# Patient Record
Sex: MALE | Race: BLACK OR AFRICAN AMERICAN | Employment: FULL TIME | URBAN - METROPOLITAN AREA
[De-identification: names, ages, dates, MRNs, and addresses within clinical notes are randomized per-mention and may not be internally consistent; named-entity substitution may affect disease eponyms.]

---

## 2021-10-24 ENCOUNTER — APPOINTMENT (OUTPATIENT)
Dept: GENERAL RADIOLOGY | Age: 41
DRG: 045 | End: 2021-10-24
Payer: MEDICAID

## 2021-10-24 ENCOUNTER — APPOINTMENT (OUTPATIENT)
Dept: CT IMAGING | Age: 41
DRG: 045 | End: 2021-10-24
Payer: MEDICAID

## 2021-10-24 ENCOUNTER — HOSPITAL ENCOUNTER (INPATIENT)
Age: 41
LOS: 1 days | Discharge: HOME | DRG: 045 | End: 2021-10-26
Attending: EMERGENCY MEDICINE | Admitting: INTERNAL MEDICINE
Payer: MEDICAID

## 2021-10-24 ENCOUNTER — HOSPITAL ENCOUNTER (EMERGENCY)
Age: 41
Discharge: LEFT BEFORE DISCHARGE | DRG: 045 | End: 2021-10-24
Attending: EMERGENCY MEDICINE
Payer: MEDICAID

## 2021-10-24 VITALS
OXYGEN SATURATION: 98 % | HEART RATE: 92 BPM | RESPIRATION RATE: 18 BRPM | BODY MASS INDEX: 29.53 KG/M2 | DIASTOLIC BLOOD PRESSURE: 128 MMHG | SYSTOLIC BLOOD PRESSURE: 221 MMHG | WEIGHT: 200 LBS | TEMPERATURE: 97.2 F

## 2021-10-24 DIAGNOSIS — G45.9 TIA (TRANSIENT ISCHEMIC ATTACK): Primary | ICD-10-CM

## 2021-10-24 DIAGNOSIS — G45.9 TIA (TRANSIENT ISCHEMIC ATTACK): ICD-10-CM

## 2021-10-24 DIAGNOSIS — I16.1 HYPERTENSIVE EMERGENCY: Primary | ICD-10-CM

## 2021-10-24 PROBLEM — R79.89 ELEVATED SERUM CREATININE: Status: ACTIVE | Noted: 2021-10-24

## 2021-10-24 LAB
AMPHETAMINE SCREEN, URINE: NOT DETECTED
ANION GAP SERPL CALCULATED.3IONS-SCNC: 12 MMOL/L (ref 7–16)
ANION GAP SERPL CALCULATED.3IONS-SCNC: 13 MMOL/L (ref 7–16)
APTT: 32.5 SEC (ref 24.5–35.1)
BARBITURATE SCREEN URINE: NOT DETECTED
BASOPHILS ABSOLUTE: 0.02 E9/L (ref 0–0.2)
BASOPHILS RELATIVE PERCENT: 0.3 % (ref 0–2)
BENZODIAZEPINE SCREEN, URINE: NOT DETECTED
BUN BLDV-MCNC: 15 MG/DL (ref 6–20)
BUN BLDV-MCNC: 19 MG/DL (ref 6–20)
CALCIUM SERPL-MCNC: 9.1 MG/DL (ref 8.6–10.2)
CALCIUM SERPL-MCNC: 9.6 MG/DL (ref 8.6–10.2)
CANNABINOID SCREEN URINE: NOT DETECTED
CHLORIDE BLD-SCNC: 102 MMOL/L (ref 98–107)
CHLORIDE BLD-SCNC: 104 MMOL/L (ref 98–107)
CHLORIDE URINE RANDOM: 141 MMOL/L
CHOLESTEROL, TOTAL: 220 MG/DL (ref 0–199)
CO2: 22 MMOL/L (ref 22–29)
CO2: 25 MMOL/L (ref 22–29)
COCAINE METABOLITE SCREEN URINE: NOT DETECTED
CREAT SERPL-MCNC: 1.1 MG/DL (ref 0.7–1.2)
CREAT SERPL-MCNC: 1.3 MG/DL (ref 0.7–1.2)
CREATININE URINE: 258 MG/DL (ref 40–278)
CREATININE URINE: 260 MG/DL (ref 40–278)
CREATININE URINE: 261 MG/DL (ref 40–278)
EOSINOPHILS ABSOLUTE: 0.29 E9/L (ref 0.05–0.5)
EOSINOPHILS RELATIVE PERCENT: 5 % (ref 0–6)
FENTANYL SCREEN, URINE: NOT DETECTED
GFR AFRICAN AMERICAN: >60
GFR AFRICAN AMERICAN: >60
GFR NON-AFRICAN AMERICAN: >60 ML/MIN/1.73
GFR NON-AFRICAN AMERICAN: >60 ML/MIN/1.73
GLUCOSE BLD-MCNC: 106 MG/DL (ref 74–99)
GLUCOSE BLD-MCNC: 114 MG/DL (ref 74–99)
HBA1C MFR BLD: 5.2 % (ref 4–5.6)
HCT VFR BLD CALC: 46.6 % (ref 37–54)
HDLC SERPL-MCNC: 61 MG/DL
HEMOGLOBIN: 16.3 G/DL (ref 12.5–16.5)
IMMATURE GRANULOCYTES #: 0.03 E9/L
IMMATURE GRANULOCYTES %: 0.5 % (ref 0–5)
INR BLD: 1
LDL CHOLESTEROL CALCULATED: 150 MG/DL (ref 0–99)
LYMPHOCYTES ABSOLUTE: 2.06 E9/L (ref 1.5–4)
LYMPHOCYTES RELATIVE PERCENT: 35.7 % (ref 20–42)
Lab: NORMAL
MCH RBC QN AUTO: 29.4 PG (ref 26–35)
MCHC RBC AUTO-ENTMCNC: 35 % (ref 32–34.5)
MCV RBC AUTO: 84.1 FL (ref 80–99.9)
METER GLUCOSE: 103 MG/DL (ref 74–99)
METHADONE SCREEN, URINE: NOT DETECTED
MICROALBUMIN UR-MCNC: 33 MG/L
MICROALBUMIN/CREAT UR-RTO: 12.8 (ref 0–30)
MONOCYTES ABSOLUTE: 0.66 E9/L (ref 0.1–0.95)
MONOCYTES RELATIVE PERCENT: 11.4 % (ref 2–12)
NEUTROPHILS ABSOLUTE: 2.71 E9/L (ref 1.8–7.3)
NEUTROPHILS RELATIVE PERCENT: 47.1 % (ref 43–80)
OPIATE SCREEN URINE: NOT DETECTED
OXYCODONE URINE: NOT DETECTED
PDW BLD-RTO: 12.2 FL (ref 11.5–15)
PHENCYCLIDINE SCREEN URINE: NOT DETECTED
PLATELET # BLD: 170 E9/L (ref 130–450)
PMV BLD AUTO: 10.1 FL (ref 7–12)
POTASSIUM REFLEX MAGNESIUM: 3.6 MMOL/L (ref 3.5–5)
POTASSIUM SERPL-SCNC: 4 MMOL/L (ref 3.5–5)
POTASSIUM, UR: 96.9 MMOL/L
PROTEIN PROTEIN: 18 MG/DL (ref 0–12)
PROTEIN/CREAT RATIO: 0.1
PROTEIN/CREAT RATIO: 0.1 (ref 0–0.2)
PROTHROMBIN TIME: 10.6 SEC (ref 9.3–12.4)
RBC # BLD: 5.54 E12/L (ref 3.8–5.8)
SODIUM BLD-SCNC: 138 MMOL/L (ref 132–146)
SODIUM BLD-SCNC: 140 MMOL/L (ref 132–146)
SODIUM URINE: 173 MMOL/L
TRIGL SERPL-MCNC: 44 MG/DL (ref 0–149)
TROPONIN, HIGH SENSITIVITY: 7 NG/L (ref 0–11)
VLDLC SERPL CALC-MCNC: 9 MG/DL
WBC # BLD: 5.8 E9/L (ref 4.5–11.5)

## 2021-10-24 PROCEDURE — 99283 EMERGENCY DEPT VISIT LOW MDM: CPT

## 2021-10-24 PROCEDURE — 84300 ASSAY OF URINE SODIUM: CPT

## 2021-10-24 PROCEDURE — 82436 ASSAY OF URINE CHLORIDE: CPT

## 2021-10-24 PROCEDURE — 2500000003 HC RX 250 WO HCPCS: Performed by: EMERGENCY MEDICINE

## 2021-10-24 PROCEDURE — 80061 LIPID PANEL: CPT

## 2021-10-24 PROCEDURE — 6370000000 HC RX 637 (ALT 250 FOR IP): Performed by: INTERNAL MEDICINE

## 2021-10-24 PROCEDURE — 82044 UR ALBUMIN SEMIQUANTITATIVE: CPT

## 2021-10-24 PROCEDURE — 80048 BASIC METABOLIC PNL TOTAL CA: CPT

## 2021-10-24 PROCEDURE — 82570 ASSAY OF URINE CREATININE: CPT

## 2021-10-24 PROCEDURE — 71045 X-RAY EXAM CHEST 1 VIEW: CPT

## 2021-10-24 PROCEDURE — 93005 ELECTROCARDIOGRAM TRACING: CPT | Performed by: EMERGENCY MEDICINE

## 2021-10-24 PROCEDURE — 84133 ASSAY OF URINE POTASSIUM: CPT

## 2021-10-24 PROCEDURE — 82962 GLUCOSE BLOOD TEST: CPT

## 2021-10-24 PROCEDURE — 85025 COMPLETE CBC W/AUTO DIFF WBC: CPT

## 2021-10-24 PROCEDURE — 96374 THER/PROPH/DIAG INJ IV PUSH: CPT

## 2021-10-24 PROCEDURE — 2580000003 HC RX 258: Performed by: INTERNAL MEDICINE

## 2021-10-24 PROCEDURE — 99284 EMERGENCY DEPT VISIT MOD MDM: CPT

## 2021-10-24 PROCEDURE — 83036 HEMOGLOBIN GLYCOSYLATED A1C: CPT

## 2021-10-24 PROCEDURE — 80307 DRUG TEST PRSMV CHEM ANLYZR: CPT

## 2021-10-24 PROCEDURE — 70450 CT HEAD/BRAIN W/O DYE: CPT

## 2021-10-24 PROCEDURE — 99220 PR INITIAL OBSERVATION CARE/DAY 70 MINUTES: CPT | Performed by: INTERNAL MEDICINE

## 2021-10-24 PROCEDURE — 99254 IP/OBS CNSLTJ NEW/EST MOD 60: CPT | Performed by: PSYCHIATRY & NEUROLOGY

## 2021-10-24 PROCEDURE — 6370000000 HC RX 637 (ALT 250 FOR IP): Performed by: PSYCHIATRY & NEUROLOGY

## 2021-10-24 PROCEDURE — 96376 TX/PRO/DX INJ SAME DRUG ADON: CPT

## 2021-10-24 PROCEDURE — 85610 PROTHROMBIN TIME: CPT

## 2021-10-24 PROCEDURE — G0378 HOSPITAL OBSERVATION PER HR: HCPCS

## 2021-10-24 PROCEDURE — 84484 ASSAY OF TROPONIN QUANT: CPT

## 2021-10-24 PROCEDURE — 6360000002 HC RX W HCPCS: Performed by: EMERGENCY MEDICINE

## 2021-10-24 PROCEDURE — 85730 THROMBOPLASTIN TIME PARTIAL: CPT

## 2021-10-24 PROCEDURE — 96375 TX/PRO/DX INJ NEW DRUG ADDON: CPT

## 2021-10-24 PROCEDURE — 36415 COLL VENOUS BLD VENIPUNCTURE: CPT

## 2021-10-24 PROCEDURE — 6360000002 HC RX W HCPCS: Performed by: INTERNAL MEDICINE

## 2021-10-24 PROCEDURE — 84156 ASSAY OF PROTEIN URINE: CPT

## 2021-10-24 RX ORDER — ACETAMINOPHEN 650 MG/1
650 SUPPOSITORY RECTAL EVERY 6 HOURS PRN
Status: DISCONTINUED | OUTPATIENT
Start: 2021-10-24 | End: 2021-10-26 | Stop reason: HOSPADM

## 2021-10-24 RX ORDER — CLOPIDOGREL BISULFATE 75 MG/1
75 TABLET ORAL DAILY
Status: DISCONTINUED | OUTPATIENT
Start: 2021-10-24 | End: 2021-10-26 | Stop reason: HOSPADM

## 2021-10-24 RX ORDER — LABETALOL HYDROCHLORIDE 5 MG/ML
10 INJECTION, SOLUTION INTRAVENOUS EVERY 4 HOURS PRN
Status: DISCONTINUED | OUTPATIENT
Start: 2021-10-24 | End: 2021-10-25

## 2021-10-24 RX ORDER — ASPIRIN 81 MG/1
81 TABLET, CHEWABLE ORAL DAILY
Status: DISCONTINUED | OUTPATIENT
Start: 2021-10-24 | End: 2021-10-26 | Stop reason: HOSPADM

## 2021-10-24 RX ORDER — SODIUM CHLORIDE 9 MG/ML
25 INJECTION, SOLUTION INTRAVENOUS PRN
Status: DISCONTINUED | OUTPATIENT
Start: 2021-10-24 | End: 2021-10-26 | Stop reason: HOSPADM

## 2021-10-24 RX ORDER — HYDRALAZINE HYDROCHLORIDE 20 MG/ML
10 INJECTION INTRAMUSCULAR; INTRAVENOUS ONCE
Status: COMPLETED | OUTPATIENT
Start: 2021-10-24 | End: 2021-10-24

## 2021-10-24 RX ORDER — AMLODIPINE BESYLATE 5 MG/1
5 TABLET ORAL DAILY
Status: DISCONTINUED | OUTPATIENT
Start: 2021-10-24 | End: 2021-10-24

## 2021-10-24 RX ORDER — LABETALOL HYDROCHLORIDE 5 MG/ML
10 INJECTION, SOLUTION INTRAVENOUS ONCE
Status: COMPLETED | OUTPATIENT
Start: 2021-10-24 | End: 2021-10-24

## 2021-10-24 RX ORDER — SODIUM CHLORIDE 0.9 % (FLUSH) 0.9 %
10 SYRINGE (ML) INJECTION EVERY 12 HOURS SCHEDULED
Status: DISCONTINUED | OUTPATIENT
Start: 2021-10-24 | End: 2021-10-26 | Stop reason: HOSPADM

## 2021-10-24 RX ORDER — ONDANSETRON 2 MG/ML
4 INJECTION INTRAMUSCULAR; INTRAVENOUS EVERY 6 HOURS PRN
Status: DISCONTINUED | OUTPATIENT
Start: 2021-10-24 | End: 2021-10-26 | Stop reason: HOSPADM

## 2021-10-24 RX ORDER — ONDANSETRON 4 MG/1
4 TABLET, ORALLY DISINTEGRATING ORAL EVERY 8 HOURS PRN
Status: DISCONTINUED | OUTPATIENT
Start: 2021-10-24 | End: 2021-10-26 | Stop reason: HOSPADM

## 2021-10-24 RX ORDER — AMLODIPINE BESYLATE 10 MG/1
10 TABLET ORAL DAILY
Status: DISCONTINUED | OUTPATIENT
Start: 2021-10-25 | End: 2021-10-26 | Stop reason: HOSPADM

## 2021-10-24 RX ORDER — ACETAMINOPHEN 325 MG/1
650 TABLET ORAL EVERY 6 HOURS PRN
Status: DISCONTINUED | OUTPATIENT
Start: 2021-10-24 | End: 2021-10-26 | Stop reason: HOSPADM

## 2021-10-24 RX ORDER — POLYETHYLENE GLYCOL 3350 17 G/17G
17 POWDER, FOR SOLUTION ORAL DAILY PRN
Status: DISCONTINUED | OUTPATIENT
Start: 2021-10-24 | End: 2021-10-26 | Stop reason: HOSPADM

## 2021-10-24 RX ORDER — HYDRALAZINE HYDROCHLORIDE 20 MG/ML
10 INJECTION INTRAMUSCULAR; INTRAVENOUS EVERY 4 HOURS PRN
Status: DISCONTINUED | OUTPATIENT
Start: 2021-10-24 | End: 2021-10-24

## 2021-10-24 RX ORDER — ATORVASTATIN CALCIUM 40 MG/1
40 TABLET, FILM COATED ORAL NIGHTLY
Status: DISCONTINUED | OUTPATIENT
Start: 2021-10-24 | End: 2021-10-26 | Stop reason: HOSPADM

## 2021-10-24 RX ORDER — SODIUM CHLORIDE 0.9 % (FLUSH) 0.9 %
10 SYRINGE (ML) INJECTION PRN
Status: DISCONTINUED | OUTPATIENT
Start: 2021-10-24 | End: 2021-10-26 | Stop reason: HOSPADM

## 2021-10-24 RX ADMIN — HYDRALAZINE HYDROCHLORIDE 10 MG: 20 INJECTION INTRAMUSCULAR; INTRAVENOUS at 14:50

## 2021-10-24 RX ADMIN — HYDRALAZINE HYDROCHLORIDE 10 MG: 20 INJECTION INTRAMUSCULAR; INTRAVENOUS at 08:57

## 2021-10-24 RX ADMIN — Medication 10 ML: at 19:42

## 2021-10-24 RX ADMIN — ASPIRIN 81 MG CHEWABLE TABLET 81 MG: 81 TABLET CHEWABLE at 14:50

## 2021-10-24 RX ADMIN — LABETALOL HYDROCHLORIDE 10 MG: 5 INJECTION, SOLUTION INTRAVENOUS at 05:04

## 2021-10-24 RX ADMIN — LABETALOL HYDROCHLORIDE 10 MG: 5 INJECTION INTRAVENOUS at 06:45

## 2021-10-24 RX ADMIN — CLOPIDOGREL BISULFATE 75 MG: 75 TABLET ORAL at 16:33

## 2021-10-24 RX ADMIN — AMLODIPINE BESYLATE 5 MG: 5 TABLET ORAL at 14:50

## 2021-10-24 RX ADMIN — ONDANSETRON 4 MG: 2 INJECTION INTRAMUSCULAR; INTRAVENOUS at 17:54

## 2021-10-24 RX ADMIN — ATORVASTATIN CALCIUM 40 MG: 40 TABLET, FILM COATED ORAL at 19:41

## 2021-10-24 NOTE — H&P
company     Family History:   History reviewed. No pertinent family history. REVIEW OF SYSTEMS:    · Constitutional: No fever, no chills, no change in weight; good appetite  · HEENT: No blurred vision, no ear problems, no sore throat, no rhinorrhea. · Respiratory: No cough, no sputum production, no pleuritic chest pain, no shortness of breath  · Cardiology: No angina, no dyspnea on exertion, no paroxysmal nocturnal dyspnea, no orthopnea, no palpitation, no leg swelling. · Gastroenterology: No dysphagia, no reflux; no abdominal pain, no nausea or vomiting; no constipation or diarrhea.  No hematochezia   · Genitourinary: No dysuria, no frequency, hesitancy; no hematuria  · Musculoskeletal: no joint pain, no myalgia, no change in range of movement  · Neurology: felling slight heaviness in the right upper and lower extremity , no slurred speech, no double vision, no tingling or numbness sensation  · Endocrinology: no temperature intolerance, no polyphagia, polydipsia or polyuria  · Hematology: no increased bleeding, no bruising, no lymphadenopathy  · Skin: no skin changes noticed by patient  · Psychology: no depressed mood, no suicidal ideation    Physical Exam   · Vitals: BP (!) 213/128   Pulse 88   Temp 98.7 °F (37.1 °C)   Resp 18   Wt 220 lb (99.8 kg)   SpO2 99%   BMI 32.49 kg/m²     · Head: normocephalic and atraumatic  · Eyes: pupils equal, round, and reactive to light, extraocular eye movements intact, conjunctivae normal  · ENT: tympanic membrane, external ear and ear canal normal bilaterally, oropharynx clear and moist with normal mucous membranes  · Neck: neck supple and non tender without mass, no thyromegaly or thyroid nodules, no cervical lymphadenopathy   · Pulmonary/Chest: clear to auscultation bilaterally- no wheezes, rales or rhonchi, normal air movement, no respiratory distress  · Cardiovascular: normal rate, normal S1 and S2, no gallops, intact distal pulses and no carotid bruits  · Abdomen: soft, non-tender, non-distended, normal bowel sounds, no masses or organomegaly  · Extremities: no cyanosis and no clubbing  · Neurologic: gait and coordination normal and speech normal, cranial nerves intact, no sensory or motor deficit     Labs and Imaging Studies   Basic Labs  Recent Labs     10/24/21  0434      K 3.6      CO2 25   BUN 19   CREATININE 1.3*   GLUCOSE 114*   CALCIUM 9.1       Recent Labs     10/24/21  0434   WBC 5.8   RBC 5.54   HGB 16.3   HCT 46.6   MCV 84.1   MCH 29.4   MCHC 35.0*   RDW 12.2      MPV 10.1         Imaging Studies:  No orders to display            Resident's Assessment and Plan     Assessment and Plan:    Acute onset right sided upper and lower extremity weakness 2/2 TIA vs hypertensive emergency   · Acute onset of the symptoms and rapid resolution within 24 hours  · Blood pressure: 213/128  · Head CT negative for any acute abnormality  · ABCD score 5 (may consider DAPT for 21 days)  · Hb A1C: 5.2   · lipid panel: , HDL:66, total cholesterol 220    · Carotid ultrasound   · Brain MRI w and w/o contrast   · Neck MRA   · Urine drug screen   · Permissive hypertension pre neurology recs  · Start ASA 81 mg  · Start Lipitor 40 mg    Newly diagnosed hypertension    · Started on Amlodipine 10 mg daily   · Labetalol PRN     Elevated creatinine, chronic vs acute?    · Baseline creatinine is unknown   · Given the fact that Cr is trending down within a short period of time, it's more likely an acute process. (possiblly 2/2 hypertensive emergency)  · Trend renal function        PT/OT evaluation: none   DVT prophylaxis/ GI prophylaxis: Lovenox   Disposition: admit to the telemetry     Sophia Menas MD, PGY-1  Attending physician: Dr. Judge Soto

## 2021-10-24 NOTE — CONSULTS
Neurology - Inpatient Consultation     Consult Requested By: Asya Reeves MD    Indication for Consult: TIA    History of Present Illness: The patient is a 78-year-old male with no prior medical history who presented to the emergency department with complaints of weakness of his right arm and right leg characterized by clumsiness of his right arm and heaviness of his right leg. Symptoms started suddenly at 9 PM yesterday while he was at home and persisted until approximately 1 PM today. Blood pressure this morning was 221/121. Patient is currently neurologically nonfocal.      Past Medical History:   History reviewed. No pertinent past medical history. Past Surgical History:   History reviewed. No pertinent surgical history. Allergies:   Patient has no known allergies. Medications:     Prior to Admission medications    Not on File       Social History:     Social History     Tobacco Use    Smoking status: Never Smoker   Substance Use Topics    Alcohol use: No    Drug use: No       Review of Systems:   General/constitutional, Dermatologic, Ophthalmologic, ENT, Cardiovascular, Pulmonary, Gastrointestinal, Genitourinary, Psychiatric, Neurological, Hematologic, Musculoskeletal and Endocrine systems were reviewed. Symptoms for each system are noted in this note or are otherwise negative. Family History:   Stroke in father    Objective:   BP (!) 196/122 Comment: manual  Pulse 99   Temp 97 °F (36.1 °C) (Temporal)   Resp 16   Ht 5' 11\" (1.803 m)   Wt 207 lb 3.7 oz (94 kg)   SpO2 97%   BMI 28.90 kg/m²     GENERAL: Patient is alert and interacts appropriately; comprehension and speech are normal.  PSYCHIATRIC: Normal mood; affect appropriate to context. HEENT: Atraumatic; Normocephalic; PERRL; EOMI; moist oropharynx. RESPIRATORY: Breathing is unlabored; respiratory rate normal; symmetric air intake and chest rise. CARDIOVASCULAR: Regular rate and rhythm; no ankle edema.   GASTROINTESTINAL: Abdomen soft, non-tender, non-distended. SKIN: No vesicular, erythematous, or ecchymotic lesions. MUSCULOSKELETAL: Muscle tone and range of motion normal in all extremities; no resting tremors or rigidity. NEUROLOGICAL: Cranial nerves II through XII intact; left arm motor strength is 5/5; right arm motor strength is 5/5; left leg motor strength is 5/5; right leg motor strength is 5/5; cutaneous sensation is intact and symmetric throughout; no extinction to double simultaneous stimulation; deep tendon reflexes are 2+ in biceps and patellae bilaterally; no dysmetria on finger-to-nose testing. Laboratory/Radiology:     CBC:   Lab Results   Component Value Date    WBC 5.8 10/24/2021    RBC 5.54 10/24/2021    HGB 16.3 10/24/2021    HCT 46.6 10/24/2021    MCV 84.1 10/24/2021    MCH 29.4 10/24/2021    MCHC 35.0 10/24/2021    RDW 12.2 10/24/2021     10/24/2021    MPV 10.1 10/24/2021     CMP:    Lab Results   Component Value Date     10/24/2021    K 4.0 10/24/2021    K 3.6 10/24/2021     10/24/2021    CO2 22 10/24/2021    BUN 15 10/24/2021    CREATININE 1.1 10/24/2021    GFRAA >60 10/24/2021    LABGLOM >60 10/24/2021    GLUCOSE 106 10/24/2021    CALCIUM 9.6 10/24/2021       Lab Results   Component Value Date    LABA1C 5.2 10/24/2021     Lab Results   Component Value Date    LDLCALC 150 (H) 10/24/2021       CT Head WO Contrast    Result Date: 10/24/2021  No acute intracranial abnormality. XR CHEST PORTABLE    Result Date: 10/24/2021  No acute process. I have independently reviewed the most recent labs, diagnostic studies and neuroradiological images available for this patient.     Assessment:   TIA with hypertensive emergency resulting in prolonged weakness of right arm and right leg (weakness resolved)      Plan:   Continue daily aspirin and statin started this admission; Plavix for 21 days(ABCD2 score 5)    Permissive hypertension for 24 hours; continue daily amlodipine started this admission    Complete stroke work-up with MRI brain MRA head and neck and TTE.     Neurochecks every 4 hours      Torres Potter MD,   3:42 PM  10/24/2021

## 2021-10-24 NOTE — ED NOTES
Patient is requesting to leave AMA to take daughter home. Dr. Jeovany Redd made aware. IV removed. Patient signed AMA paper.       Sarah Tanner RN  10/24/21 0614

## 2021-10-24 NOTE — ED PROVIDER NOTES
HPI:  10/24/21,   Time: 4:31 AM EDT       Jeffrie Duverney is a 41 y.o. male presenting to the ED for strokelike symptoms, beginning 9 PM  The complaint has been intermittent, moderate in severity, and worsened by nothing. The patient states that approximately at 9 PM he began having right-sided weakness. He states that he felt as if his  strength was weaker in his right arm just did not feel right. He states that this continued to last throughout the night therefore he came to the ED to be evaluated. He states that since coming to the emergency department his symptoms have improved. He states he is feeling much better and feels as if his weakness has resolved. Denies any chest pain shortness of breath. No numbness or tingling. No palpitations. No headaches or vision changes. Review of Systems:   Pertinent positives and negatives are stated within HPI, all other systems reviewed and are negative.          --------------------------------------------- PAST HISTORY ---------------------------------------------  Past Medical History:  has no past medical history on file. Past Surgical History:  has no past surgical history on file. Social History:  reports that he has never smoked. He does not have any smokeless tobacco history on file. He reports that he does not drink alcohol and does not use drugs. Family History: family history is not on file. The patients home medications have been reviewed. Allergies: Patient has no known allergies.         ---------------------------------------------------PHYSICAL EXAM--------------------------------------    Constitutional/General: Alert and oriented x3, well appearing, non toxic in NAD  Head: Normocephalic and atraumatic  Eyes: PERRL, EOMI, conjunctive normal, sclera non icteric  Mouth: Oropharynx clear, handling secretions, no trismus, no asymmetry of the posterior oropharynx or uvular edema  Neck: Supple, full ROM, non tender to palpation in the midline, no stridor, no crepitus, no meningeal signs  Respiratory: Lungs clear to auscultation bilaterally, no wheezes, rales, or rhonchi. Not in respiratory distress  Cardiovascular:  Regular rate. Regular rhythm. No murmurs, gallops, or rubs. 2+ distal pulses  GI:  Abdomen Soft, Non tender, Non distended. +BS. No organomegaly, no palpable masses,  No rebound, guarding, or rigidity. Musculoskeletal: Moves all extremities x 4. Warm and well perfused, no clubbing, cyanosis, or edema. Capillary refill <3 seconds  Integument: skin warm and dry. No rashes. Neurologic: GCS 15, no focal deficits, symmetric strength 5/5 in the upper and lower extremities bilaterally, sensation intact all 4 extremities, cranial nerves II to XII intact  Psychiatric: Normal Affect    -------------------------------------------------- RESULTS -------------------------------------------------  I have personally reviewed all laboratory and imaging results for this patient. Results are listed below.      LABS:  Results for orders placed or performed during the hospital encounter of 10/24/21   CBC Auto Differential   Result Value Ref Range    WBC 5.8 4.5 - 11.5 E9/L    RBC 5.54 3.80 - 5.80 E12/L    Hemoglobin 16.3 12.5 - 16.5 g/dL    Hematocrit 46.6 37.0 - 54.0 %    MCV 84.1 80.0 - 99.9 fL    MCH 29.4 26.0 - 35.0 pg    MCHC 35.0 (H) 32.0 - 34.5 %    RDW 12.2 11.5 - 15.0 fL    Platelets 112 453 - 334 E9/L    MPV 10.1 7.0 - 12.0 fL    Neutrophils % 47.1 43.0 - 80.0 %    Immature Granulocytes % 0.5 0.0 - 5.0 %    Lymphocytes % 35.7 20.0 - 42.0 %    Monocytes % 11.4 2.0 - 12.0 %    Eosinophils % 5.0 0.0 - 6.0 %    Basophils % 0.3 0.0 - 2.0 %    Neutrophils Absolute 2.71 1.80 - 7.30 E9/L    Immature Granulocytes # 0.03 E9/L    Lymphocytes Absolute 2.06 1.50 - 4.00 E9/L    Monocytes Absolute 0.66 0.10 - 0.95 E9/L    Eosinophils Absolute 0.29 0.05 - 0.50 E9/L    Basophils Absolute 0.02 0.00 - 0.20 E4/W   Basic Metabolic Panel w/ Reflex to MG   Result Value Ref Range    Sodium 140 132 - 146 mmol/L    Potassium reflex Magnesium 3.6 3.5 - 5.0 mmol/L    Chloride 102 98 - 107 mmol/L    CO2 25 22 - 29 mmol/L    Anion Gap 13 7 - 16 mmol/L    Glucose 114 (H) 74 - 99 mg/dL    BUN 19 6 - 20 mg/dL    CREATININE 1.3 (H) 0.7 - 1.2 mg/dL    GFR Non-African American >60 >=60 mL/min/1.73    GFR African American >60     Calcium 9.1 8.6 - 10.2 mg/dL   Troponin   Result Value Ref Range    Troponin, High Sensitivity 7 0 - 11 ng/L   Protime-INR   Result Value Ref Range    Protime 10.6 9.3 - 12.4 sec    INR 1.0    APTT   Result Value Ref Range    aPTT 32.5 24.5 - 35.1 sec   POCT Glucose   Result Value Ref Range    Meter Glucose 103 (H) 74 - 99 mg/dL       RADIOLOGY:  Interpreted by Radiologist.  CT Head WO Contrast   Final Result   No acute intracranial abnormality. XR CHEST PORTABLE   Final Result   No acute process. EKG:  This EKG is signed and interpreted by the EP. EKG shows normal sinus rhythm 89 bpm.  Left atrial enlargement. LVH noted. Prolonged QT. Nonspecific ST-T wave changes noted. No STEMI.    ------------------------- NURSING NOTES AND VITALS REVIEWED ---------------------------   The nursing notes within the ED encounter and vital signs as below have been reviewed by myself. BP (!) 213/120   Pulse 92   Temp 97.2 °F (36.2 °C)   Resp 18   Wt 200 lb (90.7 kg)   SpO2 98%   BMI 29.53 kg/m²   Oxygen Saturation Interpretation: Normal    The patients available past medical records and past encounters were reviewed. ------------------------------ ED COURSE/MEDICAL DECISION MAKING----------------------  Medications   labetalol (NORMODYNE;TRANDATE) injection 10 mg (has no administration in time range)   labetalol (NORMODYNE;TRANDATE) injection 10 mg (10 mg IntraVENous Given 10/24/21 0504)         ED COURSE:       Medical Decision Making: This is a 42-year-old male presents to the ED for right-sided weakness.   Upon arrival to the ED the patient has no neurological deficits. NIH is 0. Patient was extremely hypertensive. Patient given a dose of labetalol. Patient then underwent stroke work-up. Patient's laboratory work-up showed a normal CBC. Normal chemistry except for creatinine of 1.3. Troponins were 7. Coagulation studies negative. Head CT and chest x-ray negative. After 1 dose of labetalol patient's blood pressure was 213/120 and patient received a second dose of labetalol. The patient seems to be experiencing hypertensive emergency with neuro deficits that have resolved with likely a TIA. I spoke to the patient about this and recommended admission. I discussed with him that his blood pressure being this high is likely the cause of his symptoms that he had earlier and that he is at high risk to have further strokes. Patient understands and agrees with admission but states that he has his daughter with him at this time he has no one to come pick her up and he must take her to his mother prior to admission. I did discuss with him the risk of going home including but not limited to worsening blood pressure stroke heart attack and even death. Patient understands accepts these risk. Patient will leave [address] but intends to return emergently to the emergency department after he dropped off his daughter for admission. Critical care: 33 minutes    I, Dr. Jeovany Carlson, am the primary provider for this encounter    This patient's ED course included: a personal history and physicial examination, re-evaluation prior to disposition, multiple bedside re-evaluations, IV medications, cardiac monitoring, continuous pulse oximetry and complex medical decision making and emergency management    This patient has remained hemodynamically stable during their ED course. Re-Evaluations:             Re-evaluation. Patients symptoms are improving      Counseling:    The emergency provider has spoken with the patient and discussed todays results, in addition to providing specific details for the plan of care and counseling regarding the diagnosis and prognosis. Questions are answered at this time and they are agreeable with the plan.       --------------------------------- IMPRESSION AND DISPOSITION ---------------------------------    IMPRESSION  1. Hypertensive emergency    2. TIA (transient ischemic attack)        DISPOSITION  Disposition: Other Disposition: Left AMA  Patient condition is serious    NOTE: This report was transcribed using voice recognition software.  Every effort was made to ensure accuracy; however, inadvertent computerized transcription errors may be present        Boris Rushing DO  10/24/21 [id number]

## 2021-10-24 NOTE — ED PROVIDER NOTES
Department of Emergency Medicine   ED  Provider Note  Admit Date/RoomTime: 10/24/2021  8:13 AM  ED Room: 08/08          History of Present Illness:  10/24/21, Time: 8:35 AM EDT  Chief Complaint   Patient presents with    Extremity Weakness     right side since last night, was just here, left to take daughter home and came back                Liston Hodgkin is a 41 y.o. male presenting to the ED for extremity weakness. Patient states he developed right-sided upper and lower extremity weakness and numbness yesterday. Came on gradually, nothing made it better or worse, did resolve on its own. He was evaluated in the emergency room department, found to be extremely hypertensive. No known history of hypertension. He signed out AMA as he had to drop off his daughter, he is now ready to be admitted. No new symptoms. Denies fever, chills, nausea, vomiting, change in bowel or bladder, neck pain or stiffness, paresthesias, lethargy, back pain, or any other symptoms or complaints. Review of Systems:   Pertinent positives and negatives are stated within HPI, all other systems reviewed and are negative.        --------------------------------------------- PAST HISTORY ---------------------------------------------  Past Medical History:  has no past medical history on file. Past Surgical History:  has no past surgical history on file. Social History:  reports that he has never smoked. He does not have any smokeless tobacco history on file. He reports that he does not drink alcohol and does not use drugs. Family History: family history is not on file. . Unless otherwise noted, family history is non contributory    The patients home medications have been reviewed. Allergies: Patient has no known allergies.         ---------------------------------------------------PHYSICAL EXAM--------------------------------------    Constitutional/General: Alert and oriented x3  Head: Normocephalic and atraumatic  Eyes: injection 10 mg (10 mg IntraVENous Given 10/24/21 0857)           The cardiac monitor revealed sinus with a heart rate in the 80s as interpreted by me. The cardiac monitor was ordered secondary to the patient's TIA and to monitor the patient for dysrhythmia. CPT [id number]         Medical Decision Making:    Patient had no neurological deficits on arrival. NIH was 0. Did receive hydralazine as he was hypertensive. Discussed with the hospitalist, patient was admitted. Counseling: The emergency provider has spoken with the patient and discussed todays results, in addition to providing specific details for the plan of care and counseling regarding the diagnosis and prognosis. Questions are answered at this time and they are agreeable with the plan.       --------------------------------- IMPRESSION AND DISPOSITION ---------------------------------    IMPRESSION  1. TIA (transient ischemic attack)        DISPOSITION  Disposition: Admit to telemetry  Patient condition is stable        NOTE: This report was transcribed using voice recognition software.  Every effort was made to ensure accuracy; however, inadvertent computerized transcription errors may be present        Baltazar Son MD  10/24/21 [id number]

## 2021-10-25 ENCOUNTER — APPOINTMENT (OUTPATIENT)
Dept: MRI IMAGING | Age: 41
DRG: 045 | End: 2021-10-25
Payer: MEDICAID

## 2021-10-25 LAB
ALBUMIN SERPL-MCNC: 4.6 G/DL (ref 3.5–5.2)
ALP BLD-CCNC: 104 U/L (ref 40–129)
ALT SERPL-CCNC: 42 U/L (ref 0–40)
ANION GAP SERPL CALCULATED.3IONS-SCNC: 13 MMOL/L (ref 7–16)
AST SERPL-CCNC: 28 U/L (ref 0–39)
BASOPHILS ABSOLUTE: 0.03 E9/L (ref 0–0.2)
BASOPHILS RELATIVE PERCENT: 0.4 % (ref 0–2)
BILIRUB SERPL-MCNC: 0.8 MG/DL (ref 0–1.2)
BUN BLDV-MCNC: 18 MG/DL (ref 6–20)
CALCIUM SERPL-MCNC: 9.4 MG/DL (ref 8.6–10.2)
CHLORIDE BLD-SCNC: 104 MMOL/L (ref 98–107)
CO2: 22 MMOL/L (ref 22–29)
CREAT SERPL-MCNC: 1.3 MG/DL (ref 0.7–1.2)
EKG ATRIAL RATE: 89 BPM
EKG P AXIS: 60 DEGREES
EKG P-R INTERVAL: 166 MS
EKG Q-T INTERVAL: 398 MS
EKG QRS DURATION: 92 MS
EKG QTC CALCULATION (BAZETT): 484 MS
EKG R AXIS: 51 DEGREES
EKG T AXIS: 7 DEGREES
EKG VENTRICULAR RATE: 89 BPM
EOSINOPHILS ABSOLUTE: 0.05 E9/L (ref 0.05–0.5)
EOSINOPHILS RELATIVE PERCENT: 0.7 % (ref 0–6)
GFR AFRICAN AMERICAN: >60
GFR NON-AFRICAN AMERICAN: >60 ML/MIN/1.73
GLUCOSE BLD-MCNC: 120 MG/DL (ref 74–99)
HCT VFR BLD CALC: 47.6 % (ref 37–54)
HEMOGLOBIN: 16.3 G/DL (ref 12.5–16.5)
IMMATURE GRANULOCYTES #: 0.02 E9/L
IMMATURE GRANULOCYTES %: 0.3 % (ref 0–5)
LYMPHOCYTES ABSOLUTE: 1.45 E9/L (ref 1.5–4)
LYMPHOCYTES RELATIVE PERCENT: 19.7 % (ref 20–42)
MCH RBC QN AUTO: 30 PG (ref 26–35)
MCHC RBC AUTO-ENTMCNC: 34.2 % (ref 32–34.5)
MCV RBC AUTO: 87.5 FL (ref 80–99.9)
MONOCYTES ABSOLUTE: 0.67 E9/L (ref 0.1–0.95)
MONOCYTES RELATIVE PERCENT: 9.1 % (ref 2–12)
NEUTROPHILS ABSOLUTE: 5.15 E9/L (ref 1.8–7.3)
NEUTROPHILS RELATIVE PERCENT: 69.8 % (ref 43–80)
PDW BLD-RTO: 12.5 FL (ref 11.5–15)
PLATELET # BLD: 201 E9/L (ref 130–450)
PMV BLD AUTO: 10.5 FL (ref 7–12)
POTASSIUM REFLEX MAGNESIUM: 3.9 MMOL/L (ref 3.5–5)
RBC # BLD: 5.44 E12/L (ref 3.8–5.8)
SODIUM BLD-SCNC: 139 MMOL/L (ref 132–146)
TOTAL PROTEIN: 8.3 G/DL (ref 6.4–8.3)
WBC # BLD: 7.4 E9/L (ref 4.5–11.5)

## 2021-10-25 PROCEDURE — 2580000003 HC RX 258: Performed by: INTERNAL MEDICINE

## 2021-10-25 PROCEDURE — 2500000003 HC RX 250 WO HCPCS: Performed by: INTERNAL MEDICINE

## 2021-10-25 PROCEDURE — 96372 THER/PROPH/DIAG INJ SC/IM: CPT

## 2021-10-25 PROCEDURE — 2060000000 HC ICU INTERMEDIATE R&B

## 2021-10-25 PROCEDURE — 6370000000 HC RX 637 (ALT 250 FOR IP): Performed by: NURSE PRACTITIONER

## 2021-10-25 PROCEDURE — 80053 COMPREHEN METABOLIC PANEL: CPT

## 2021-10-25 PROCEDURE — 2500000003 HC RX 250 WO HCPCS: Performed by: PSYCHIATRY & NEUROLOGY

## 2021-10-25 PROCEDURE — 6360000002 HC RX W HCPCS: Performed by: INTERNAL MEDICINE

## 2021-10-25 PROCEDURE — 99232 SBSQ HOSP IP/OBS MODERATE 35: CPT | Performed by: INTERNAL MEDICINE

## 2021-10-25 PROCEDURE — 70547 MR ANGIOGRAPHY NECK W/O DYE: CPT

## 2021-10-25 PROCEDURE — 93010 ELECTROCARDIOGRAM REPORT: CPT | Performed by: INTERNAL MEDICINE

## 2021-10-25 PROCEDURE — 81240 F2 GENE: CPT

## 2021-10-25 PROCEDURE — 85025 COMPLETE CBC W/AUTO DIFF WBC: CPT

## 2021-10-25 PROCEDURE — 70544 MR ANGIOGRAPHY HEAD W/O DYE: CPT

## 2021-10-25 PROCEDURE — 6370000000 HC RX 637 (ALT 250 FOR IP): Performed by: INTERNAL MEDICINE

## 2021-10-25 PROCEDURE — 96375 TX/PRO/DX INJ NEW DRUG ADDON: CPT

## 2021-10-25 PROCEDURE — 92523 SPEECH SOUND LANG COMPREHEN: CPT

## 2021-10-25 PROCEDURE — 70551 MRI BRAIN STEM W/O DYE: CPT

## 2021-10-25 PROCEDURE — 6370000000 HC RX 637 (ALT 250 FOR IP): Performed by: PSYCHIATRY & NEUROLOGY

## 2021-10-25 PROCEDURE — 36415 COLL VENOUS BLD VENIPUNCTURE: CPT

## 2021-10-25 PROCEDURE — 99232 SBSQ HOSP IP/OBS MODERATE 35: CPT | Performed by: NURSE PRACTITIONER

## 2021-10-25 RX ORDER — HYDRALAZINE HYDROCHLORIDE 25 MG/1
25 TABLET, FILM COATED ORAL EVERY 12 HOURS SCHEDULED
Status: DISCONTINUED | OUTPATIENT
Start: 2021-10-25 | End: 2021-10-26

## 2021-10-25 RX ORDER — LABETALOL HYDROCHLORIDE 5 MG/ML
10 INJECTION, SOLUTION INTRAVENOUS EVERY 4 HOURS
Status: DISCONTINUED | OUTPATIENT
Start: 2021-10-25 | End: 2021-10-25

## 2021-10-25 RX ORDER — HYDRALAZINE HYDROCHLORIDE 20 MG/ML
10 INJECTION INTRAMUSCULAR; INTRAVENOUS EVERY 6 HOURS PRN
Status: DISCONTINUED | OUTPATIENT
Start: 2021-10-25 | End: 2021-10-25

## 2021-10-25 RX ORDER — LABETALOL HYDROCHLORIDE 5 MG/ML
10 INJECTION, SOLUTION INTRAVENOUS
Status: DISCONTINUED | OUTPATIENT
Start: 2021-10-25 | End: 2021-10-26 | Stop reason: HOSPADM

## 2021-10-25 RX ORDER — HYDRALAZINE HYDROCHLORIDE 20 MG/ML
10 INJECTION INTRAMUSCULAR; INTRAVENOUS
Status: DISCONTINUED | OUTPATIENT
Start: 2021-10-25 | End: 2021-10-26 | Stop reason: HOSPADM

## 2021-10-25 RX ORDER — LABETALOL HYDROCHLORIDE 5 MG/ML
10 INJECTION, SOLUTION INTRAVENOUS EVERY 6 HOURS PRN
Status: DISCONTINUED | OUTPATIENT
Start: 2021-10-25 | End: 2021-10-25

## 2021-10-25 RX ORDER — HYDRALAZINE HYDROCHLORIDE 20 MG/ML
10 INJECTION INTRAMUSCULAR; INTRAVENOUS EVERY 4 HOURS
Status: DISCONTINUED | OUTPATIENT
Start: 2021-10-25 | End: 2021-10-25

## 2021-10-25 RX ORDER — CLONIDINE HYDROCHLORIDE 0.1 MG/1
0.1 TABLET ORAL ONCE
Status: COMPLETED | OUTPATIENT
Start: 2021-10-25 | End: 2021-10-25

## 2021-10-25 RX ADMIN — AMLODIPINE BESYLATE 10 MG: 10 TABLET ORAL at 08:27

## 2021-10-25 RX ADMIN — HYDRALAZINE HYDROCHLORIDE 25 MG: 25 TABLET, FILM COATED ORAL at 20:17

## 2021-10-25 RX ADMIN — CLOPIDOGREL BISULFATE 75 MG: 75 TABLET ORAL at 08:27

## 2021-10-25 RX ADMIN — ENOXAPARIN SODIUM 40 MG: 100 INJECTION SUBCUTANEOUS at 08:28

## 2021-10-25 RX ADMIN — ACETAMINOPHEN 650 MG: 325 TABLET ORAL at 00:01

## 2021-10-25 RX ADMIN — HYDRALAZINE HYDROCHLORIDE 25 MG: 25 TABLET, FILM COATED ORAL at 11:36

## 2021-10-25 RX ADMIN — CLONIDINE HYDROCHLORIDE 0.1 MG: 0.1 TABLET ORAL at 13:45

## 2021-10-25 RX ADMIN — Medication 10 ML: at 20:27

## 2021-10-25 RX ADMIN — ATORVASTATIN CALCIUM 40 MG: 40 TABLET, FILM COATED ORAL at 20:17

## 2021-10-25 RX ADMIN — LABETALOL HYDROCHLORIDE 10 MG: 5 INJECTION INTRAVENOUS at 13:05

## 2021-10-25 RX ADMIN — HYDRALAZINE HYDROCHLORIDE 10 MG: 20 INJECTION INTRAMUSCULAR; INTRAVENOUS at 14:20

## 2021-10-25 RX ADMIN — Medication 10 ML: at 08:31

## 2021-10-25 RX ADMIN — ASPIRIN 81 MG CHEWABLE TABLET 81 MG: 81 TABLET CHEWABLE at 08:27

## 2021-10-25 RX ADMIN — LABETALOL HYDROCHLORIDE 10 MG: 5 INJECTION INTRAVENOUS at 17:14

## 2021-10-25 RX ADMIN — ACETAMINOPHEN 650 MG: 325 TABLET ORAL at 19:05

## 2021-10-25 RX ADMIN — LABETALOL HYDROCHLORIDE 10 MG: 5 INJECTION INTRAVENOUS at 08:28

## 2021-10-25 ASSESSMENT — PAIN SCALES - GENERAL
PAINLEVEL_OUTOF10: 6
PAINLEVEL_OUTOF10: 0
PAINLEVEL_OUTOF10: 5

## 2021-10-25 NOTE — PROGRESS NOTES
Mri of the brain and mra of the neck were performed without contrast. The patient was too claustrophobic and didn't want to continue.

## 2021-10-25 NOTE — PROGRESS NOTES
[facility]  Progress Note - House Team     Patient:  Rossana Matos 41 y.o. male MRN: [id number]     Date of Service: 10/25/2021     CC: right sided weakness  Overnight events: No acute events overnight    Subjective     Patient was seen and examined this morning at bedside in no acute distress. No acute events overnight. Patient has remained hypertensive to 190s-220s despite medications. Denies having any headache, changes in vision, tinnitus, numbness or tingling, loss of sensation or muscle weakness. Objective     Physical Exam:  · Vitals: BP (!) 208/120   Pulse 93   Temp 98.2 °F (36.8 °C) (Temporal)   Resp 16   Ht 5' 11\" (1.803 m)   Wt 207 lb 3.7 oz (94 kg)   SpO2 98%   BMI 28.90 kg/m²     · I & O - 24hr: No intake/output data recorded. · General Appearance: alert, appears stated age and cooperative  · HEENT:  Head: Normocephalic, no lesions, without obvious abnormality. · Eye: Normal external eye, conjunctiva, lids cornea, JANINA. · Neck: no adenopathy, no carotid bruit, no JVD, supple, symmetrical, trachea midline and thyroid not enlarged, symmetric, no tenderness/mass/nodules  · Lung: clear to auscultation bilaterally  · Heart: regular rate and rhythm, S1, S2 normal, no murmur, click, rub or gallop  · Abdomen: soft, non-tender; bowel sounds normal; no masses,  no organomegaly  · Extremities:  extremities normal, atraumatic, no cyanosis or edema  · Musculokeletal: No joint swelling, no muscle tenderness. ROM normal in all joints of extremities. · Neurologic: CN II-XII intact.  No focal neurological deficits, sensation present in all four extremities, strength 5/5 globally, upper and lower extremity reflexes 2+, Negative babinski  Subject  Pertinent Labs & Imaging Studies   rafael  CBC:   Lab Results   Component Value Date    WBC 7.4 10/25/2021    RBC 5.44 10/25/2021    HGB 16.3 10/25/2021    HCT 47.6 10/25/2021    MCV 87.5 10/25/2021    MCH 30.0 10/25/2021    MCHC 34.2 10/25/2021    RDW 12.5 10/25/2021     10/25/2021    MPV 10.5 10/25/2021     BMP:    Lab Results   Component Value Date     10/25/2021    K 3.9 10/25/2021     10/25/2021    CO2 22 10/25/2021    BUN 18 10/25/2021    LABALBU 4.6 10/25/2021    CREATININE 1.3 10/25/2021    CALCIUM 9.4 10/25/2021    GFRAA >60 10/25/2021    LABGLOM >60 10/25/2021    GLUCOSE 120 10/25/2021       MRA NECK WO CONTRAST   Final Result   Unremarkable noncontrast MRA of the neck. MRI BRAIN WO CONTRAST   Final Result   1 cm area of mildly elevated T2 signal extending to the inferior aspect of   the left corona radiata showing rather marked T2 shine through on   diffusion-weighted images and the ADC map. This most likely represents   subacute infarct although is likely unrelated to the patient's symptoms. Consider follow-up MRI in 4-6 weeks. MRA HEAD WO CONTRAST   Final Result   Normal MR angiogram of the brain. Resident's Assessment and Plan     Assessment and Plan:    1. Acute right-sided weakness 2/2 subacute stroke in inferior aspect of left corona radiata likely 2/2 hypertensive emergency  - Acute right sided weakness with resolution of symptoms within 24 hours, SBP remains in 200s, ABCD2 score 5  - MRA head and neck negative, CT head negative, UDS negative  - MRI head showed subacute infarct in the inferior aspect of left corona radiata  - SBP goal 160-180, last /88  - Lipid panel Total chol 220,   - Continue amlodipine 10 mg daily and start hydralazine 25 mg BID, PRN hydralazine and labetalol q4h   - If SBP continues to be >190, patient will likely need transferred to ICU for Cardene drip  - Continue aspirin and plavix x 21 days followed by monotherapy of ASA  - Continue atorvastatin  - Given 1 dose of clonidine 0.1 mg per Neurology  - f/u echo and HbA1c  - Neurology following  2.  ADRIANA vs CKD  - Cr 1.3, unknown baseline  - Monitor with BMP    PT/OT evaluation: consulted  DVT prophylaxis/ GI prophylaxis: lovenox/diet  Disposition: continue current care    Jasmyn Salas MD, PGY-1  Attending physician: Dr. Nilesh Dumont

## 2021-10-25 NOTE — PROGRESS NOTES
SPEECH/LANGUAGE PATHOLOGY  SPEECH/LANGUAGE/COGNITIVE EVALUATION   and PLAN OF CARE      PATIENT NAME:  Aj Cline  (male)     MRN:  [id number]    :  1980  (41 y.o.)  STATUS:  Inpatient: Room 9735/2615-M    TODAY'S DATE:  10/25/2021  REFERRING PROVIDER:  FARNAZ Beal NP   SPECIFIC PROVIDER ORDER: SLP eval and treat  Date of order:  10-25-21  REASON FOR REFERRAL:  CVA  EVALUATING THERAPIST: Barb Bosworth, SLP    ADMITTING DIAGNOSIS: TIA (transient ischemic attack) [G45.9]    VISIT DIAGNOSIS:      SPEECH THERAPY  PLAN OF CARE   The speech therapy  POC is established based on physician order, speech pathology diagnosis and results of clinical assessment     SPEECH PATHOLOGY DIAGNOSIS:    Mild dysarthria    Speech Pathology intervention is recommended 3-6 times per week for LOS or when goals are met with emphasis on the following:      Conditions Requiring Skilled Therapeutic Intervention for speech, language and/or cognition    Dysarthria     Specific Speech Therapy Interventions to Include: Therapeutic exercises    Specific instructions for next treatment: To initiate speech production tasks    SHORT/LONG TERM GOALS  Pt will improve speech intelligibility and increased articulatory precision via compensatory strategies and oral motor exercises     Patient goals: Patient/family involved in developing goals and treatment plan:   Treatment goals discussed with Patient    The Patient understand(s) the diagnosis, prognosis and plan of care   The patient/family Agreed with above,     This plan may be re-evaluated and revised as warranted.         Rehabilitation Potential/Prognosis: good                CLINICAL ASSESSMENT:  MOTOR SPEECH       Oral Peripheral Examination   Right labiobuccal weakness and Right lingual deviation     Parameters of Speech Production  Respiration:  Adequate for speech production  Articulation:  Distortion  Resonance:  Within functional limits  Quality:   Within functional limits  Pitch: Within functional limits  Intensity: Within functional limits  Fluency:  Intact  Prosody Intact    RECEPTIVE LANGUAGE    Comprehension of Yes/No Questions: Within functional limits    Process  Simple Verbal Commands:   Within functional limits  Process Intermediate Verbal Commands:   Within functional limits  Process Complex Verbal Commands:     Within functional limits    Comprehension of Conversation:      Within functional limits      EXPRESSIVE LANGUAGE     Serials: Functional    Imitation:  Words   Functional   Sentences Functional    Naming:  (Modality used:  Verbal)  Confrontation Naming  Functional  Functional Description  Functional  Response Naming: Functional    Conversation:      Conversation was within functional limits    COGNITION     Attention/Orientation  Attention: Sustained attention   Orientation:  Oriented to Person, Place, Date, Reason for hospitalization    Memory   Immediate Recall: Repeated 3/3    Delayed Recall:   Recalled 2/3    Long Term Recall:   Recalled Address, Birthdate, Age and Family    Organization/Problem Solving/Reasoning   Verbal Sequencing:   Functional        Verbal Problem solving:   Functional          CLINICAL OBSERVATIONS NOTED DURING THE EVALUATION  Within functional limits                  EDUCATION:   The Speech Language Pathologist (SLP) completed education regarding results of evaluation and that intervention is warranted at this time. Learner: Patient  Education: Reviewed results and recommendations of this evaluation  Evaluation of Education:  Verbalizes understanding    Evaluation Time includes thorough review of current medical information, gathering information on past medical history/social history and prior level of function, completion of standardized testing/informal observation of tasks, assessment of data and education on plan of care and goals.       CPT code:    00781  eval speech sound lang comprehension      The admitting diagnosis and active problem list, as listed below have been reviewed prior to initiation of this evaluation.         ACTIVE PROBLEM LIST:   Patient Active Problem List   Diagnosis    TIA (transient ischemic attack)    Hypertensive emergency    Elevated serum creatinine    ADRIANA (acute kidney injury) ([facility] [address]

## 2021-10-25 NOTE — CARE COORDINATION
Met with patient at bedside to discuss transition of care planning. Patient was independent prior to admission. He does not have a history with any [address] [place] [address] or facilities for rehab. Patient uses [facility] in [facility]. Patient does not use any DME. Patient does not have PCP; patient requested phone number (information placed in discharge navigator). Patient lives with dad and sister in a 2 story home with no steps to enter. Patient's father, Sarah Smith to provide ride when patient is discharged. Awaiting ECHO, PT/OT/Speech evaluations.     Katya Phan RN

## 2021-10-25 NOTE — PROGRESS NOTES
Dixie Ramirez notified via perfect serve regarding patient meeting inpatient criteria d/t positive MRI and requested orders for PT/OT/Speech.     Katya Phan RN

## 2021-10-25 NOTE — PROGRESS NOTES
[facility]  Internal Medicine Residency / [address]    Attending Physician Statement  I have discussed the case, including pertinent history and exam findings with the resident and the team.  I have seen and examined the patient and the key elements of the encounter have been performed by me. I have also personally reviewed imaging studies and labs. I agree with the assessment, plan and orders as documented by the resident. Doing well this morning without any neurological deficits however BP remains uncontrolled. Denies chest pain, headache, blurring or vision. Assessment:  1. Subacute strok, L corona radiata likely 2/2 uncontrolled HTN. LVH on EKG - long standing uncontrolled HTN. No significant carotid stenosis. 2. ADRIANA vs CKD from #1. He did not received contrast from imaging yesterday. Plan:  Scheduled hydralazine added this morning. 1 time dose of clonidine also ordered by Neurology. If no significant control of BP with current scheduled meds and prn labetalol/hydralazine, will need transfer to unit for cardene gtt. ASA + plavix x 21 days follow with monotherapy with ASA  Statin        Remainder of medical problems as per resident note. Fran Valencia MD  Internal Medicine Residency Faculty

## 2021-10-25 NOTE — PROGRESS NOTES
Cory Grossman is a 41 y.o. right handed male     Neurology is following for acute stroke    Patient presented to ED with complaints of right-sided weakness. Patient also complained of clumsiness to his right arm and heaviness to his right leg. Symptoms started around 9 PM on 10/23 while he was home. Patient symptoms persisted until 10/24 around 1 PM.  MRI brain consistent with subacute stroke on the left corona radiata unrevealing. Complete echo pending    At present time patient has a mild right hemiparesis and dysarthria on exam however he is denying this; states he feels back to baseline. Patient has on his close from home and pacing the room on my arrival.    Patient's blood pressure remains elevated    There is no family present at bedside    Objective:     BP (!) 208/120   Pulse 95   Temp 98.3 °F (36.8 °C) (Temporal)   Resp 18   Ht 5' 11\" (1.803 m)   Wt 207 lb 3.7 oz (94 kg)   SpO2 99%   BMI 28.90 kg/m²     General appearance: alert, appears stated age, cooperative and no distress  Head: normocephalic, without obvious abnormality, atraumatic  Eyes: conjunctivae/corneas clear.  .  Neck: supple, symmetrical, trachea midline and thyroid not enlarged  Lungs: Unlabored breaths  Heart: regular rate and rhythm  Extremities: normal, atraumatic, no cyanosis or edema  Pulses: 2+ and symmetric  Skin: color, texture, turgor normal---no rashes or lesions      Mental Status: Awake, alert and oriented x4, follows commands well, pleasant    Appropriate attention/concentration  Intact fundus of knowledge  Intact memories    Speech: Mild-moderate dysarthria; patient denies this states is due to fatigue from no sleep last night  Language: no aphasias    Cranial Nerves:  I: smell NA   II: visual acuity  NA   II: visual fields Full to confrontation   II: pupils JANINA   III,VII: ptosis None   III,IV,VI: extraocular muscles   EOMI without nystagmus   V: mastication Normal   V: facial light touch sensation  Normal   V,VII: corneal reflex     VII: facial muscle function - upper  Normal   VII: facial muscle function - lower R lower facial droop    VIII: hearing Normal   IX: soft palate elevation  Normal   IX,X: gag reflex    XI: trapezius strength  5/5   XI: sternocleidomastoid strength 5/5   XI: neck extension strength  5/5   XII: tongue strength  Normal     Motor:  Subtle right hemiparesis -5/5  Left side 5/5  Normal bulk and tone  No abnormal movements    Sensory:  T intact    Coordination:   FN, FFM and VEL intact  HKS intact    Gait:  Normal    DTR:   +2 throughout  No Babinskis  No Guerrero's    No pathological reflexes    Laboratory/Radiology:     CBC:   Lab Results   Component Value Date    WBC 7.4 10/25/2021    RBC 5.44 10/25/2021    HGB 16.3 10/25/2021    HCT 47.6 10/25/2021    MCV 87.5 10/25/2021    MCH 30.0 10/25/2021    MCHC 34.2 10/25/2021    RDW 12.5 10/25/2021     10/25/2021    MPV 10.5 10/25/2021     CMP:    Lab Results   Component Value Date     10/25/2021    K 3.9 10/25/2021     10/25/2021    CO2 22 10/25/2021    BUN 18 10/25/2021    CREATININE 1.3 10/25/2021    GFRAA >60 10/25/2021    LABGLOM >60 10/25/2021    GLUCOSE 120 10/25/2021    PROT 8.3 10/25/2021    LABALBU 4.6 10/25/2021    CALCIUM 9.4 10/25/2021    BILITOT 0.8 10/25/2021    ALKPHOS 104 10/25/2021    AST 28 10/25/2021    ALT 42 10/25/2021     Lab Results   Component Value Date    LABA1C 5.2 10/24/2021     FLP:    Lab Results   Component Value Date    TRIG 44 10/24/2021    HDL 61 10/24/2021    LDLCALC 150 10/24/2021    LABVLDL 9 10/24/2021     MRA NECK WO CONTRAST   Final Result   Unremarkable noncontrast MRA of the neck. MRI BRAIN WO CONTRAST   Final Result   1 cm area of mildly elevated T2 signal extending to the inferior aspect of   the left corona radiata showing rather marked T2 shine through on   diffusion-weighted images and the ADC map.   This most likely represents   subacute infarct although is likely unrelated to the patient's symptoms. Consider follow-up MRI in 4-6 weeks. MRA HEAD WO CONTRAST   Final Result   Normal MR angiogram of the brain.              All labs and images personally reviewed today    Assessment:     Patient presented with right-sided weakness and heaviness   Symptoms started the night prior to arrival and reportedly resolved Sunday   On exam patient has dysarthria and right hemiparesis but he denies this   MRI brain consistent with subacute infarct of the left corona radiata   Patient's stroke is most likely due to uncontrolled hypertension    Hypertensive urgency   Last /120   Patient's BPs have been elevated the entire admission   One-time dose of clonidine given    Plan:     Continue supportive care  Blood pressure control is imperative   One-time dose of clonidine  Continue DAPT and high intensity statin   DAPT for 21 days then ASA only   Hypercoagulable panel ordered  Complete echo pending  A1C 5.2,   Continue telemetry  Neuro checks q4hrs  SCDs  Stroke education  PT/OT/ST ordered    [address], APRN - NP-C   1:14 PM  10/25/2021

## 2021-10-25 NOTE — PLAN OF CARE
Problem: Falls - Risk of:  Goal: Will remain free from falls  Description: Will remain free from falls  10/25/2021 1933 by Benjamín Payne RN  Outcome: Met This Shift  10/25/2021 0651 by Leopold Min, RN  Outcome: Met This Shift  Goal: Absence of physical injury  Description: Absence of physical injury  10/25/2021 1933 by Benjamín Payne RN  Outcome: Met This Shift  10/25/2021 0651 by Leopold Min, RN  Outcome: Met This Shift

## 2021-10-26 VITALS
BODY MASS INDEX: 28.8 KG/M2 | HEIGHT: 71 IN | RESPIRATION RATE: 18 BRPM | TEMPERATURE: 97.7 F | SYSTOLIC BLOOD PRESSURE: 160 MMHG | WEIGHT: 205.69 LBS | OXYGEN SATURATION: 97 % | DIASTOLIC BLOOD PRESSURE: 90 MMHG | HEART RATE: 90 BPM

## 2021-10-26 LAB
ANION GAP SERPL CALCULATED.3IONS-SCNC: 16 MMOL/L (ref 7–16)
ANTI-NUCLEAR ANTIBODY (ANA): NEGATIVE
BASOPHILS ABSOLUTE: 0.03 E9/L (ref 0–0.2)
BASOPHILS RELATIVE PERCENT: 0.4 % (ref 0–2)
BUN BLDV-MCNC: 19 MG/DL (ref 6–20)
C-REACTIVE PROTEIN: 0.3 MG/DL (ref 0–0.4)
CALCIUM SERPL-MCNC: 9.8 MG/DL (ref 8.6–10.2)
CHLORIDE BLD-SCNC: 99 MMOL/L (ref 98–107)
CO2: 21 MMOL/L (ref 22–29)
CREAT SERPL-MCNC: 1.3 MG/DL (ref 0.7–1.2)
EOSINOPHILS ABSOLUTE: 0.09 E9/L (ref 0.05–0.5)
EOSINOPHILS RELATIVE PERCENT: 1.3 % (ref 0–6)
GFR AFRICAN AMERICAN: >60
GFR NON-AFRICAN AMERICAN: >60 ML/MIN/1.73
GLUCOSE BLD-MCNC: 106 MG/DL (ref 74–99)
HCT VFR BLD CALC: 46.3 % (ref 37–54)
HEMOGLOBIN: 15.9 G/DL (ref 12.5–16.5)
HOMOCYSTEINE: 14.6 UMOL/L (ref 0–15)
IMMATURE GRANULOCYTES #: 0.02 E9/L
IMMATURE GRANULOCYTES %: 0.3 % (ref 0–5)
LV EF: 60 %
LVEF MODALITY: NORMAL
LYMPHOCYTES ABSOLUTE: 1.79 E9/L (ref 1.5–4)
LYMPHOCYTES RELATIVE PERCENT: 25.8 % (ref 20–42)
MCH RBC QN AUTO: 29.4 PG (ref 26–35)
MCHC RBC AUTO-ENTMCNC: 34.3 % (ref 32–34.5)
MCV RBC AUTO: 85.6 FL (ref 80–99.9)
MONOCYTES ABSOLUTE: 0.69 E9/L (ref 0.1–0.95)
MONOCYTES RELATIVE PERCENT: 9.9 % (ref 2–12)
NEUTROPHILS ABSOLUTE: 4.33 E9/L (ref 1.8–7.3)
NEUTROPHILS RELATIVE PERCENT: 62.3 % (ref 43–80)
PDW BLD-RTO: 12.6 FL (ref 11.5–15)
PLATELET # BLD: 189 E9/L (ref 130–450)
PMV BLD AUTO: 10.6 FL (ref 7–12)
POTASSIUM REFLEX MAGNESIUM: 3.7 MMOL/L (ref 3.5–5)
RBC # BLD: 5.41 E12/L (ref 3.8–5.8)
SEDIMENTATION RATE, ERYTHROCYTE: 21 MM/HR (ref 0–15)
SODIUM BLD-SCNC: 136 MMOL/L (ref 132–146)
WBC # BLD: 7 E9/L (ref 4.5–11.5)

## 2021-10-26 PROCEDURE — 86146 BETA-2 GLYCOPROTEIN ANTIBODY: CPT

## 2021-10-26 PROCEDURE — 2580000003 HC RX 258: Performed by: INTERNAL MEDICINE

## 2021-10-26 PROCEDURE — 6370000000 HC RX 637 (ALT 250 FOR IP): Performed by: INTERNAL MEDICINE

## 2021-10-26 PROCEDURE — 6370000000 HC RX 637 (ALT 250 FOR IP): Performed by: PSYCHIATRY & NEUROLOGY

## 2021-10-26 PROCEDURE — 86787 VARICELLA-ZOSTER ANTIBODY: CPT

## 2021-10-26 PROCEDURE — 85651 RBC SED RATE NONAUTOMATED: CPT

## 2021-10-26 PROCEDURE — 97161 PT EVAL LOW COMPLEX 20 MIN: CPT

## 2021-10-26 PROCEDURE — 6360000002 HC RX W HCPCS: Performed by: INTERNAL MEDICINE

## 2021-10-26 PROCEDURE — 83695 ASSAY OF LIPOPROTEIN(A): CPT

## 2021-10-26 PROCEDURE — 81241 F5 GENE: CPT

## 2021-10-26 PROCEDURE — 80048 BASIC METABOLIC PNL TOTAL CA: CPT

## 2021-10-26 PROCEDURE — 85613 RUSSELL VIPER VENOM DILUTED: CPT

## 2021-10-26 PROCEDURE — 93306 TTE W/DOPPLER COMPLETE: CPT

## 2021-10-26 PROCEDURE — 85240 CLOT FACTOR VIII AHG 1 STAGE: CPT

## 2021-10-26 PROCEDURE — 86038 ANTINUCLEAR ANTIBODIES: CPT

## 2021-10-26 PROCEDURE — 86703 HIV-1/HIV-2 1 RESULT ANTBDY: CPT

## 2021-10-26 PROCEDURE — 86140 C-REACTIVE PROTEIN: CPT

## 2021-10-26 PROCEDURE — 83090 ASSAY OF HOMOCYSTEINE: CPT

## 2021-10-26 PROCEDURE — 36415 COLL VENOUS BLD VENIPUNCTURE: CPT

## 2021-10-26 PROCEDURE — 86592 SYPHILIS TEST NON-TREP QUAL: CPT

## 2021-10-26 PROCEDURE — 85025 COMPLETE CBC W/AUTO DIFF WBC: CPT

## 2021-10-26 PROCEDURE — 85306 CLOT INHIBIT PROT S FREE: CPT

## 2021-10-26 PROCEDURE — 86147 CARDIOLIPIN ANTIBODY EA IG: CPT

## 2021-10-26 PROCEDURE — 99232 SBSQ HOSP IP/OBS MODERATE 35: CPT | Performed by: INTERNAL MEDICINE

## 2021-10-26 RX ORDER — ATORVASTATIN CALCIUM 40 MG/1
40 TABLET, FILM COATED ORAL NIGHTLY
Qty: 30 TABLET | Refills: 3 | Status: SHIPPED | OUTPATIENT
Start: 2021-10-26 | End: 2022-03-10 | Stop reason: SDUPTHER

## 2021-10-26 RX ORDER — LABETALOL 100 MG/1
50 TABLET, FILM COATED ORAL EVERY 12 HOURS SCHEDULED
Qty: 60 TABLET | Refills: 3 | Status: SHIPPED | OUTPATIENT
Start: 2021-10-26 | End: 2021-11-02 | Stop reason: SINTOL

## 2021-10-26 RX ORDER — AMLODIPINE BESYLATE 10 MG/1
10 TABLET ORAL DAILY
Qty: 30 TABLET | Refills: 3 | Status: SHIPPED | OUTPATIENT
Start: 2021-10-27 | End: 2022-03-10 | Stop reason: SDUPTHER

## 2021-10-26 RX ORDER — HYDRALAZINE HYDROCHLORIDE 50 MG/1
50 TABLET, FILM COATED ORAL EVERY 8 HOURS SCHEDULED
Status: DISCONTINUED | OUTPATIENT
Start: 2021-10-26 | End: 2021-10-26 | Stop reason: HOSPADM

## 2021-10-26 RX ORDER — ASPIRIN 81 MG/1
81 TABLET, CHEWABLE ORAL DAILY
Qty: 30 TABLET | Refills: 3 | Status: SHIPPED | OUTPATIENT
Start: 2021-10-27

## 2021-10-26 RX ORDER — LABETALOL 100 MG/1
50 TABLET, FILM COATED ORAL EVERY 12 HOURS SCHEDULED
Status: DISCONTINUED | OUTPATIENT
Start: 2021-10-26 | End: 2021-10-26 | Stop reason: HOSPADM

## 2021-10-26 RX ORDER — HYDRALAZINE HYDROCHLORIDE 25 MG/1
25 TABLET, FILM COATED ORAL ONCE
Status: COMPLETED | OUTPATIENT
Start: 2021-10-26 | End: 2021-10-26

## 2021-10-26 RX ORDER — CLOPIDOGREL BISULFATE 75 MG/1
75 TABLET ORAL DAILY
Qty: 18 TABLET | Refills: 0 | Status: SHIPPED | OUTPATIENT
Start: 2021-10-27 | End: 2022-03-22 | Stop reason: ALTCHOICE

## 2021-10-26 RX ORDER — HYDRALAZINE HYDROCHLORIDE 50 MG/1
50 TABLET, FILM COATED ORAL EVERY 8 HOURS SCHEDULED
Qty: 90 TABLET | Refills: 3 | Status: SHIPPED | OUTPATIENT
Start: 2021-10-26 | End: 2022-03-22 | Stop reason: SDUPTHER

## 2021-10-26 RX ADMIN — LABETALOL HYDROCHLORIDE 50 MG: 100 TABLET, FILM COATED ORAL at 10:45

## 2021-10-26 RX ADMIN — AMLODIPINE BESYLATE 10 MG: 10 TABLET ORAL at 07:39

## 2021-10-26 RX ADMIN — ACETAMINOPHEN 650 MG: 325 TABLET ORAL at 12:28

## 2021-10-26 RX ADMIN — CLOPIDOGREL BISULFATE 75 MG: 75 TABLET ORAL at 07:40

## 2021-10-26 RX ADMIN — HYDRALAZINE HYDROCHLORIDE 25 MG: 25 TABLET, FILM COATED ORAL at 07:39

## 2021-10-26 RX ADMIN — HYDRALAZINE HYDROCHLORIDE 50 MG: 50 TABLET, FILM COATED ORAL at 13:53

## 2021-10-26 RX ADMIN — HYDRALAZINE HYDROCHLORIDE 25 MG: 25 TABLET, FILM COATED ORAL at 10:45

## 2021-10-26 RX ADMIN — ASPIRIN 81 MG CHEWABLE TABLET 81 MG: 81 TABLET CHEWABLE at 07:39

## 2021-10-26 RX ADMIN — Medication 10 ML: at 07:40

## 2021-10-26 RX ADMIN — ONDANSETRON 4 MG: 4 TABLET, ORALLY DISINTEGRATING ORAL at 15:00

## 2021-10-26 ASSESSMENT — PAIN SCALES - GENERAL
PAINLEVEL_OUTOF10: 6
PAINLEVEL_OUTOF10: 0

## 2021-10-26 NOTE — PROGRESS NOTES
[facility] [place] [address] [address]     OGEQ:  Patient Name: Reyes Atlas  MRN: [id number]  : 1980  ROOM #: [id number]    Occupational Therapy Screen    OT orders received, chart reviewed, and screen completed. Pt states he has been up independently to bathroom during this admission and reports no questions/concerns from an OT standpoint at this time. Skilled OT services not warranted. OT will discontinue orders at this time. Please reorder OT if there is a change in patient's physical/medical status.      Thank you,    Tiesha Lundy OTR/L #[id number]

## 2021-10-26 NOTE — PROGRESS NOTES
Physical Therapy  Physical Therapy Initial Assessment     Name: Danielle La  : 1980  MRN: [id number]      Date of Service: 10/26/2021    Evaluating PT:  Quique Donaldson PT, DPT [id number]     Room #:  5031/8593-J  Diagnosis:  TIA (transient ischemic attack) [G45.9]  Reason for admission: stroke like symptoms   Precautions:  Falls  Procedure/Surgery:  None    Equipment Recommendations:  None     SUBJECTIVE:  Pt lives with dad and sister in a 2 story home with 0 stair(s) to enter and 0 rail(s). Bed is on 2nd floor and bath is on 2nd floor. Pt ambulated with no AD PTA. OBJECTIVE:   Initial Evaluation  Date:    AM-PAC 6 Clicks    Was pt agreeable to Eval/treatment? yes   Does pt have pain? denies   Bed Mobility  Rolling: NT  Supine to sit: Independent  Sit to supine: independent  Scooting: independent    Transfers Sit to stand: independent  Stand to sit: independent  Stand pivot: NT   Ambulation    150 feet with no AD independent     Stair negotiation: ascended and descended  10 steps with 1 rail independent   ROM BUE:  See OT eval   BLE:  WFL   Strength BUE:  See OT eval   BLE:  knee ext 5/5  Ankle DF 5/5   Balance Sitting EOB:  independent  Dynamic Standing:  independent     -Pt is A & O x 3  -Sensation:  Pt denies numbness and tingling to extremities  -Edema:  unremarkable     Therapeutic Exercises:  Functional activity     Patient education  Pt educated on safety, sequencing of transfers, and role of PT    Patient response to education:   Pt verbalized understanding Pt demonstrated skill Pt requires further education in this area   yes yes no     ASSESSMENT:  Conditions Requiring Skilled Therapeutic Intervention:  NA    Comments:  Pt received supine in bed and agreeable to PT session   Pt able to get out of bed, stand, ambulate, and complete stair negotiation without difficulty or assist. Pt is at independent baseline with no acute care PT goals/needs identified. DC from PT service.     Pt with all needs met and call light in reach. Pt would benefit from continued PT POC to address functional deficits described above. Treatment:  Patient practiced and was instructed in the following treatment:     Patient education provided continuously throughout session for sequencing, safety maintenance, and improving any deficits found during the evaluation. PHYSICAL THERAPY PLAN OF CARE:    PT POC is established based on physician order and patient diagnosis     Referring provider/PT Order:    10/25/21 1345  PT eval and treat     FARNAZ Felton NP     Diagnosis:  TIA (transient ischemic attack) [G45.9]    Pt is at independent baseline with no acute care PT goals/needs identified. DC from PT service. Time in  1000  Time out  1005    Total Treatment Time  - minutes     Evaluation Time includes thorough review of current medical information, gathering information on past medical history/social history and prior level of function, completion of standardized testing/informal observation of tasks, assessment of data and education on plan of care and goals.     CPT codes:  [x] Low Complexity PT evaluation 76049  [] Moderate Complexity PT evaluation 77859  [] High Complexity PT evaluation 82899  [] PT Re-evaluation 45849  [] Gait training 17213 - minutes  [] Manual therapy 83996 - minutes  [] Therapeutic activities 18004 - minutes  [] Therapeutic exercises 64964 - minutes  [] Neuromuscular reeducation 49525 -- minutes     Yamil Matos, PT, DPT  [id number]

## 2021-10-26 NOTE — PROGRESS NOTES
Lalita Segal is a 41 y.o. right handed male     Neurology is following for acute stroke    Patient presented to ED with complaints of right-sided weakness. Patient also complained of clumsiness to his right arm and heaviness to his right leg. Symptoms started around 9 PM on 10/23 while he was home. Patient symptoms persisted until 10/24 around 1 PM.  MRI brain consistent with subacute stroke on the left corona radiata unrevealing. Complete echo pending    At present time patient is resting quietly in bed asking to be discharged. Patient has no complaints. Patient's blood pressure remains elevated but improved     There is no family present at bedside    Objective:     BP (!) 158/93   Pulse 90   Temp 96.8 °F (36 °C)   Resp 18   Ht 5' 11\" (1.803 m)   Wt 205 lb 11 oz (93.3 kg)   SpO2 97%   BMI 28.69 kg/m²     General appearance: alert, appears stated age, cooperative and no distress  Head: normocephalic, without obvious abnormality, atraumatic  Eyes: conjunctivae/corneas clear.  .  Neck: supple, symmetrical, trachea midline and thyroid not enlarged  Lungs: Unlabored breaths  Heart: regular rate and rhythm  Extremities: normal, atraumatic, no cyanosis or edema  Pulses: 2+ and symmetric  Skin: color, texture, turgor normal---no rashes or lesions      Mental Status: Awake, alert and oriented x4, follows commands well, pleasant    Appropriate attention/concentration  Intact fundus of knowledge  Intact memories    Speech: Mild dysarthria; patient denies this states is due to fatigue from no sleep last night  Language: no aphasias    Cranial Nerves:  I: smell NA   II: visual acuity  NA   II: visual fields Full to confrontation   II: pupils JANINA   III,VII: ptosis None   III,IV,VI: extraocular muscles   EOMI without nystagmus   V: mastication Normal   V: facial light touch sensation  Normal   V,VII: corneal reflex     VII: facial muscle function - upper  Normal   VII: facial muscle function - lower R lower facial droop    VIII: hearing Normal   IX: soft palate elevation  Normal   IX,X: gag reflex    XI: trapezius strength  5/5   XI: sternocleidomastoid strength 5/5   XI: neck extension strength  5/5   XII: tongue strength  Normal     Motor:  Grossly 5/5  Normal bulk and tone  No abnormal movements    Sensory:  T intact    Coordination:   FN, FFM and VEL intact  HKS intact    Gait:  Normal    DTR:   +2 throughout  No Babinskis  No Guerrero's    No pathological reflexes    Laboratory/Radiology:     CBC:   Lab Results   Component Value Date    WBC 7.0 10/26/2021    RBC 5.41 10/26/2021    HGB 15.9 10/26/2021    HCT 46.3 10/26/2021    MCV 85.6 10/26/2021    MCH 29.4 10/26/2021    MCHC 34.3 10/26/2021    RDW 12.6 10/26/2021     10/26/2021    MPV 10.6 10/26/2021     CMP:    Lab Results   Component Value Date     10/26/2021    K 3.7 10/26/2021    CL 99 10/26/2021    CO2 21 10/26/2021    BUN 19 10/26/2021    CREATININE 1.3 10/26/2021    GFRAA >60 10/26/2021    LABGLOM >60 10/26/2021    GLUCOSE 106 10/26/2021    PROT 8.3 10/25/2021    LABALBU 4.6 10/25/2021    CALCIUM 9.8 10/26/2021    BILITOT 0.8 10/25/2021    ALKPHOS 104 10/25/2021    AST 28 10/25/2021    ALT 42 10/25/2021     Lab Results   Component Value Date    LABA1C 5.2 10/24/2021     FLP:    Lab Results   Component Value Date    TRIG 44 10/24/2021    HDL 61 10/24/2021    LDLCALC 150 10/24/2021    LABVLDL 9 10/24/2021     MRA NECK WO CONTRAST   Final Result   Unremarkable noncontrast MRA of the neck. MRI BRAIN WO CONTRAST   Final Result   1 cm area of mildly elevated T2 signal extending to the inferior aspect of   the left corona radiata showing rather marked T2 shine through on   diffusion-weighted images and the ADC map. This most likely represents   subacute infarct although is likely unrelated to the patient's symptoms. Consider follow-up MRI in 4-6 weeks. MRA HEAD WO CONTRAST   Final Result   Normal MR angiogram of the brain. Complete echo 10/26:  Ejection fraction is visually estimated at 60%. No regional wall motion abnormalities seen. Moderate left ventricular concentric hypertrophy noted. Normal right ventricle structure and function. Mild mitral regurgitation is present. Agitated saline injected for shunt evaluation. No evidence of patent foramen ovale. No evidence of atrial septal defect. All labs and images personally reviewed today    Assessment:     Patient presented with right-sided weakness and heaviness   Symptoms started the night prior to arrival and reportedly resolved Sunday   On exam patient has dysarthria and right hemiparesis but he denies this   MRI brain consistent with subacute infarct of the left corona radiata   Complete echo was unrevealing for wall abnormalities or PFO   Patient's stroke is most likely due to uncontrolled hypertension    Hypertensive urgency   Last /93   Patient's BPs have been elevated the entire admission   BP control is imperative as this is most likely the cause of his stroke    Plan:     Continue supportive care  Blood pressure control is imperative  Continue DAPT and high intensity statin   DAPT for 21 days then ASA only   Hypercoagulable panel pending   A1C 5.2,   Continue telemetry  Neuro checks q4hrs  SCDs  Stroke education  PT/OT/ST    Okay to discharge once blood pressure normalizes and patient is medically cleared. Patient should follow in the stroke clinic in 1 to 2 weeks. Results of hypercoagulable workup can be reviewed at that time.     FARNAZ Andersen   1:40 PM  10/26/2021

## 2021-10-26 NOTE — PROGRESS NOTES
[facility]  Internal Medicine Residency / [address]    Attending Physician Statement  I have discussed the case, including pertinent history and exam findings with the resident and the team.  I have seen and examined the patient and the key elements of the encounter have been performed by me. I have also personally reviewed imaging studies and labs. I agree with the assessment, plan and orders as documented by the resident. Doing well this morning but BP remains elevated 200s/100s in am. BP was controlled yesterday with multiple prns and scheduled medication. He remains asymptomatic - no chest pain, blurring of vision, headache, back pain, shortness of breath. He has no focal neurological symptoms. Assessment:  1. Subacute strok, L corona radiata likely 2/2 uncontrolled HTN. LVH on EKG - long standing uncontrolled HTN. No significant carotid stenosis. 2. Likely CKD from uncontrolled HTN      Plan:  Hydralazine increased to 50 TID, labetalol BID added. Continue amlodipine. DAPT x 21 days only then ASA monotherapy  Statin  Follow up results of echo and hypercoag work up outpatient  Discharge this afternoon is BP is better controlled (currently at SBP 140s - 150s which is significantly improved) but will need close outpatient follow up      Remainder of medical problems as per resident note. Wagner Boyle MD  Internal Medicine Residency Faculty

## 2021-10-26 NOTE — DISCHARGE SUMMARY
[address]  Discharge Summary    PCP: No primary care provider on file. Admit Date:10/24/2021  Discharge Date: 10/26/2021    Admission Diagnosis:   1. TIA vs Stroke  2. HTN Emergency vs Urgency  3. ADRIANA vs CKD    Discharge Diagnosis:  1. Subacute Stroke in Left Corona Radiata likely 2/2 uncontrolled HTN  2. CKD likely 2/2 uncontrolled HTN  3. Hypertensive Emergency    Hospital Course: 35yo M with no significant PMHx who presented with right sided weakness concerning for TIA vs stroke. Patient reported having some right-sided weakness around 2100 on the day prior to admission, which improved 24 hours later. Patient was found to have a blood pressure of 213/128 initially. During this hospitalization patient was started on amlodipine 10 mg daily, hydralazine 50 mg 3 times daily, labetalol 50 mg twice daily. On the day of discharge patient's blood pressure was 160/90. Patient's blood pressure medications had been increased on the day of discharge. Discussed with the patient importance of waiting 1 more day for observation of his blood pressure on new medication regimen, but patient refused and was adamant to go home the same day if his blood pressure was okay later in the day. An MRI of the brain was done which found a subacute stroke in the left corona radiata which is likely secondary to uncontrolled hypertension. Neurology has been consulted during this hospitalization. Patient was started on aspirin 81 mg daily, he will also be on clopidogrel 75 mg daily for the next 18 days to complete a total of 21 days of dual antiplatelet therapy, after which patient will be switched to aspirin only. He is also on atorvastatin 40 mg nightly. Echo was done while inpatient and showed some moderate left ventricular hypertrophy, but otherwise was unremarkable.     Patient was noted to have a mild elevation in creatinine at 1.3, baseline is unknown, but likely it is daily  - Clopidogrel 75 mg daily for the next 18 days  - Atorvastatin 40 mg nightly     Make sure to follow up with the PCP clinic appointment as scheduled and make an appointment with the neurology stroke clinic at the provided number    Dixie Ramirez MD  PGY 1   5:50 PM 10/26/2021

## 2021-10-26 NOTE — PLAN OF CARE
Problem: Falls - Risk of:  Goal: Will remain free from falls  10/26/2021 0432 by Andry Celis RN  Outcome: Met This Shift  10/25/2021 1933 by eFrn Mcdowell RN  Outcome: Met This Shift  Goal: Absence of physical injury  10/26/2021 0432 by Andry Celis RN  Outcome: Met This Shift  10/25/2021 1933 by Fern Mcdowell RN  Outcome: Met This Shift

## 2021-10-27 LAB
FACTOR VIII ACTIVITY: 124 % (ref 50–150)
HIV-1 AND HIV-2 ANTIBODIES: NORMAL
LUPUS ANTICOAG DVVT: NORMAL
RPR: NORMAL

## 2021-10-28 ENCOUNTER — TELEPHONE (OUTPATIENT)
Dept: INTERNAL MEDICINE | Age: 41
End: 2021-10-28

## 2021-10-28 LAB — VARICELLA-ZOSTER VIRUS AB, IGG: NORMAL

## 2021-10-28 NOTE — TELEPHONE ENCOUNTER
pts wife called and said that Pola Marcano feels that he is having reactions to his labetolol says that he gets a headache and upset stomach after taking it  Has a [facility] appt on 11/04/2021 so will not take this medicine until being seen on that day  Advised to monitor blood pressures and call with readings over 140/90    Reviewed above with  and advised to call pt back to see if he was taking 50mgs of the labetolol and if he was then ok to hold it but to monitor his blood pressures and to call if readings are over 140/90  Called and spoke to pts wife who said that he was taking the 50mgs twice a day and agreed to monitor his bp closely and will call the office tomorrow if readings are high

## 2021-10-29 ENCOUNTER — TELEPHONE (OUTPATIENT)
Dept: NEUROLOGY | Age: 41
End: 2021-10-29

## 2021-10-29 LAB
ANTICARDIOLIPIN IGA ANTIBODY: <10 APL (ref 0–11)
ANTICARDIOLIPIN IGG ANTIBODY: 14 GPL (ref 0–14)
CARDIOLIPIN AB IGM: 12 MPL (ref 0–12)
LIPOPROTEIN (A): 24 MG/DL
PROTEIN S ANTIGEN, FREE: 79 % (ref 74–147)

## 2021-10-30 LAB
BETA-2 GLYCOPROTEIN 1 IGA ANTIBODY: <10 SAU (ref 0–20)
VARICELLA ZOSTER AB IGM: 0.29 ISR

## 2021-11-01 LAB
FACTOR V LEIDEN: NEGATIVE
SPECIMEN: NORMAL

## 2021-11-02 ENCOUNTER — OFFICE VISIT (OUTPATIENT)
Dept: INTERNAL MEDICINE | Age: 41
End: 2021-11-02
Payer: MEDICARE

## 2021-11-02 VITALS
RESPIRATION RATE: 18 BRPM | SYSTOLIC BLOOD PRESSURE: 194 MMHG | HEART RATE: 106 BPM | HEIGHT: 71 IN | TEMPERATURE: 98.3 F | BODY MASS INDEX: 27.58 KG/M2 | WEIGHT: 197 LBS | DIASTOLIC BLOOD PRESSURE: 100 MMHG | OXYGEN SATURATION: 99 %

## 2021-11-02 DIAGNOSIS — I10 PRIMARY HYPERTENSION: Primary | ICD-10-CM

## 2021-11-02 DIAGNOSIS — Z00.00 HEALTHCARE MAINTENANCE: ICD-10-CM

## 2021-11-02 DIAGNOSIS — G45.9 TIA (TRANSIENT ISCHEMIC ATTACK): ICD-10-CM

## 2021-11-02 LAB
PROTHROMBIN G20210A MUTATION: NEGATIVE
PT PCR SPECIMEN: NORMAL

## 2021-11-02 PROCEDURE — 99214 OFFICE O/P EST MOD 30 MIN: CPT | Performed by: INTERNAL MEDICINE

## 2021-11-02 PROCEDURE — 1111F DSCHRG MED/CURRENT MED MERGE: CPT | Performed by: INTERNAL MEDICINE

## 2021-11-02 PROCEDURE — 99212 OFFICE O/P EST SF 10 MIN: CPT | Performed by: INTERNAL MEDICINE

## 2021-11-02 RX ORDER — HYDROCHLOROTHIAZIDE 25 MG/1
25 TABLET ORAL DAILY
Qty: 60 TABLET | Refills: 1 | Status: SHIPPED
Start: 2021-11-02 | End: 2022-03-10 | Stop reason: SDUPTHER

## 2021-11-02 SDOH — ECONOMIC STABILITY: FOOD INSECURITY: WITHIN THE PAST 12 MONTHS, THE FOOD YOU BOUGHT JUST DIDN'T LAST AND YOU DIDN'T HAVE MONEY TO GET MORE.: NEVER TRUE

## 2021-11-02 SDOH — ECONOMIC STABILITY: FOOD INSECURITY: WITHIN THE PAST 12 MONTHS, YOU WORRIED THAT YOUR FOOD WOULD RUN OUT BEFORE YOU GOT MONEY TO BUY MORE.: NEVER TRUE

## 2021-11-02 ASSESSMENT — ENCOUNTER SYMPTOMS
CHEST TIGHTNESS: 0
PHOTOPHOBIA: 0
CONSTIPATION: 0
NAUSEA: 0
COUGH: 0
VOICE CHANGE: 0
RHINORRHEA: 0
ABDOMINAL PAIN: 0
BACK PAIN: 0
VOMITING: 0
ABDOMINAL DISTENTION: 0
TROUBLE SWALLOWING: 0
EYE DISCHARGE: 0
CHOKING: 0
SORE THROAT: 0
DIARRHEA: 0
BLOOD IN STOOL: 0
SINUS PRESSURE: 0
WHEEZING: 0
SHORTNESS OF BREATH: 0

## 2021-11-02 ASSESSMENT — PATIENT HEALTH QUESTIONNAIRE - PHQ9
SUM OF ALL RESPONSES TO PHQ QUESTIONS 1-9: 0
1. LITTLE INTEREST OR PLEASURE IN DOING THINGS: 0
2. FEELING DOWN, DEPRESSED OR HOPELESS: 0
SUM OF ALL RESPONSES TO PHQ QUESTIONS 1-9: 0
SUM OF ALL RESPONSES TO PHQ QUESTIONS 1-9: 0
SUM OF ALL RESPONSES TO PHQ9 QUESTIONS 1 & 2: 0

## 2021-11-02 ASSESSMENT — SOCIAL DETERMINANTS OF HEALTH (SDOH): HOW HARD IS IT FOR YOU TO PAY FOR THE VERY BASICS LIKE FOOD, HOUSING, MEDICAL CARE, AND HEATING?: SOMEWHAT HARD

## 2021-11-02 NOTE — PROGRESS NOTES
Patient verbalized understanding of office instructions. He will call with questions or concerns. Pt was given discharge instructions, scripts for lab work to be done. All questions were fully answered.   Printed  AVS  given

## 2021-11-02 NOTE — PATIENT INSTRUCTIONS
1. Start taking HCTZ 25mg today  2. Please come back in2 weeks for BP check  3.  Please start checking your BP as frequently as possible

## 2021-11-02 NOTE — PROGRESS NOTES
[address]  Internal Medicine Residency Program  [facility] Note      SUBJECTIVE:  CC: had concerns including Follow-Up from Hospital (states whole right side of body felt heavy denies Headache , nausea , vomiting states B/P high). HPI:  Mayra Childress is a 41 y.o.male with PMH of subacute stroke, CKD, essential HTN presenting to [facility] for hospital follow up, and to establish care    BP today: 198/100. Currently asymptomatic (except for when he was taking labetolol), no headaches, no nausea, no blurry vision. -patient is not taking labetolol, too many side effects including nausea and upset stomach when he takes it  -otherwise: no headaches, nausea, dizziness, blurry vision, no extremity weakness, speech normal, no chest pain, no SOB. He did have some weakness of facial muscles in the hospital with droop on the right side of the face, which has been improving since discharge. Weakness of the right lower extremity has completely resolved. He did have some slurring of speech during the admission, that has improved significantly per the patient. Work-up for stroke at the hospital included the following:  -Subacute stroke seen on MRI at the inferior aspect of left corona radiata  -no significant stenosis in major neck arteries, unremarkable MR angiogram of head, no acute bleeds on CT head  -hypercoagulable panel: negative  -no significant proteinuria/microalbuminuria, but creat: 1.3, will repeat  -TC: 220/T/HDL: 61/LDL: 150, A1C: 5.2  -on DAPT, lipitor 40mg, amlodipine 10mg, hydralazine 50mg TID; Labetolol 500 BID (not taking)  -Neurology appt on  (Thursday)    PMH: none prior  Surgical history: none prior  Allergies: none known  Personal/social: denies any smoking habit, alcohol use, drug use    Review of Systems   Constitutional: Negative for activity change, chills, diaphoresis, fatigue, fever and unexpected weight change.    HENT: Negative for drooling, postnasal drip, rhinorrhea, sinus pressure, sneezing, sore throat, trouble swallowing and voice change. Eyes: Negative for photophobia, discharge and visual disturbance. Respiratory: Negative for cough, choking, chest tightness, shortness of breath and wheezing. Cardiovascular: Negative for chest pain, palpitations and leg swelling. Gastrointestinal: Negative for abdominal distention, abdominal pain, blood in stool, constipation, diarrhea, nausea and vomiting. Some nausea and upset stomach after taking labetolol, resolved as soon as he stopped taking the medication   Genitourinary: Negative for dysuria, flank pain, frequency and urgency. Musculoskeletal: Negative for arthralgias, back pain, gait problem, joint swelling, neck pain and neck stiffness. Neurological: Positive for facial asymmetry. Negative for dizziness, weakness, light-headedness, numbness and headaches. Psychiatric/Behavioral: Negative for dysphoric mood and sleep disturbance. Outpatient Medications Marked as Taking for the 11/2/21 encounter (Office Visit) with Marita Toribio MD   Medication Sig Dispense Refill    hydroCHLOROthiazide (HYDRODIURIL) 25 MG tablet Take 1 tablet by mouth daily 60 tablet 1    Misc.  Devices MISC 1 each by Does not apply route once as needed (BP check daily) Essential HTN I10 1 each 0    aspirin 81 MG chewable tablet Take 1 tablet by mouth daily 30 tablet 3    atorvastatin (LIPITOR) 40 MG tablet Take 1 tablet by mouth nightly 30 tablet 3    hydrALAZINE (APRESOLINE) 50 MG tablet Take 1 tablet by mouth every 8 hours 90 tablet 3    amLODIPine (NORVASC) 10 MG tablet Take 1 tablet by mouth daily 30 tablet 3    clopidogrel (PLAVIX) 75 MG tablet Take 1 tablet by mouth daily for 18 days 18 tablet 0       OBJECTIVE:    VS:   BP (!) 194/100   Pulse 106   Temp 98.3 °F (36.8 °C) (Oral)   Resp 18   Ht 5' 11\" (1.803 m)   Wt 197 lb (89.4 kg)   SpO2 99% Comment: on room air  BMI 27.48 kg/m²     EXAM:  Physical Exam  Constitutional:       Appearance: Normal appearance. HENT:      Head: Normocephalic. Mouth/Throat:      Mouth: Mucous membranes are moist.      Pharynx: Oropharynx is clear. No oropharyngeal exudate or posterior oropharyngeal erythema. Eyes:      General: No scleral icterus. Right eye: No discharge. Left eye: No discharge. Extraocular Movements: Extraocular movements intact. Conjunctiva/sclera: Conjunctivae normal.      Pupils: Pupils are equal, round, and reactive to light. Cardiovascular:      Rate and Rhythm: Normal rate and regular rhythm. Pulses: Normal pulses. Heart sounds: Normal heart sounds. No murmur heard. No gallop. Pulmonary:      Effort: Pulmonary effort is normal. No respiratory distress. Breath sounds: Normal breath sounds. No wheezing, rhonchi or rales. Chest:      Chest wall: No tenderness. Abdominal:      General: There is no distension. Palpations: Abdomen is soft. Tenderness: There is no abdominal tenderness. Musculoskeletal:      Cervical back: Normal range of motion. No rigidity. Skin:     General: Skin is warm. Neurological:      General: No focal deficit present. Mental Status: He is alert and oriented to person, place, and time. Cranial Nerves: No cranial nerve deficit. Sensory: No sensory deficit. Motor: No weakness. Coordination: Coordination normal.      Gait: Gait normal.      Comments: Right sided facial droop with loss of naso-labial fold, has been improving  Strength RUE: 5/5, RLE: 5/5, LUE: 5/5, LLE: 5/5  Cranial nerves: II, III, IV, V, VI, VII (minor facial droop, improving), XI, XII : WNL    Psychiatric:         Mood and Affect: Mood normal.         ASSESSMENT/PLAN:  I have reviewed all pertinent PMH, PSH, FH, SH, medications and allergies and updated history as appropriate.     1. HTN:  -current assessment possibly essential, vs secondary if remains uncontrolled, to be evaluated  -will start on HCTZ 25mg and d/c labetolol; continue amlodipine 10mg and hydralazine 50mg  -will repeat BMP before next visit  -f/u in 2 weeks for BP check    2. TIA  -Continue DAPT with asp 81mg and Plavix 75mg daily, Lipitor 40mg  -Neurology f/u 11/04/21    3. Healthcare maintenance:  -Screening: HIV 1&2, Hep C  -Vaccines: refused flu/COVID, \"is considering but not right now\"    Sweta Del Rio was seen today for follow-up from hospital.    Diagnoses and all orders for this visit:    Primary hypertension  -     hydroCHLOROthiazide (HYDRODIURIL) 25 MG tablet; Take 1 tablet by mouth daily  -     Misc. Devices MISC; 1 each by Does not apply route once as needed (BP check daily) Essential HTN I10  -     BASIC METABOLIC PANEL; Future    TIA (transient ischemic attack)  -     KS DISCHARGE MEDS RECONCILED W/ CURRENT OUTPATIENT MED LIST    Healthcare maintenance  -     HEPATITIS C ANTIBODY; Future  -     HIV-1 AND HIV-2 ANTIBODIES;  Future        RTC: in 2 weeks for BP check      I have reviewed my findings and recommendations with Aj Cline and Dr. Shantelle Lewis MD PGY-2   11/2/2021 11:52 AM

## 2022-03-10 ENCOUNTER — TELEPHONE (OUTPATIENT)
Dept: INTERNAL MEDICINE | Age: 42
End: 2022-03-10

## 2022-03-10 DIAGNOSIS — I10 PRIMARY HYPERTENSION: ICD-10-CM

## 2022-03-10 RX ORDER — HYDROCHLOROTHIAZIDE 25 MG/1
25 TABLET ORAL DAILY
Qty: 30 TABLET | Refills: 0 | Status: SHIPPED
Start: 2022-03-10 | End: 2022-03-22 | Stop reason: SDUPTHER

## 2022-03-10 RX ORDER — AMLODIPINE BESYLATE 10 MG/1
10 TABLET ORAL DAILY
Qty: 30 TABLET | Refills: 0 | Status: SHIPPED
Start: 2022-03-10 | End: 2022-03-22 | Stop reason: SDUPTHER

## 2022-03-10 RX ORDER — ATORVASTATIN CALCIUM 40 MG/1
40 TABLET, FILM COATED ORAL NIGHTLY
Qty: 30 TABLET | Refills: 0 | Status: SHIPPED
Start: 2022-03-10 | End: 2022-03-22 | Stop reason: SDUPTHER

## 2022-03-10 NOTE — TELEPHONE ENCOUNTER
----- Message from Nolan Bean sent at 3/10/2022 11:07 AM EST -----  Subject: Refill Request    QUESTIONS  Name of Medication? hydroCHLOROthiazide (HYDRODIURIL) 25 MG tablet  Patient-reported dosage and instructions? 25 mg  How many days do you have left? 0  Preferred Pharmacy? [place] [facility] [address]  phone number (if available)? [phone]  ---------------------------------------------------------------------------  --------------,  Name of Medication? amLODIPine (NORVASC) 10 MG tablet  Patient-reported dosage and instructions? 10 mg  How many days do you have left? 0  [facility] [address] phone number (if available)? [phone]  ---------------------------------------------------------------------------  --------------,  Name of Medication? atorvastatin (LIPITOR) 40 MG tablet  Patient-reported dosage and instructions? 40 mg   How many days do you have left? 0  [place] Pharmacy[place] [address] [address] [facility] phone number (if available)? [phone]  ---------------------------------------------------------------------------  --------------  CALL BACK INFO  What is the best way for the office to contact you? OK to leave message on   voicemail  Preferred Call Back Phone Number?  [phone]

## 2022-03-10 NOTE — TELEPHONE ENCOUNTER
Called patient to remind him of appointment scheduled for 3/22/22.  Patient also reminded to bring medications with him,

## 2022-03-22 ENCOUNTER — OFFICE VISIT (OUTPATIENT)
Dept: INTERNAL MEDICINE | Age: 42
End: 2022-03-22
Payer: MEDICARE

## 2022-03-22 VITALS
SYSTOLIC BLOOD PRESSURE: 136 MMHG | HEIGHT: 71 IN | OXYGEN SATURATION: 99 % | WEIGHT: 179.1 LBS | DIASTOLIC BLOOD PRESSURE: 78 MMHG | HEART RATE: 84 BPM | RESPIRATION RATE: 18 BRPM | TEMPERATURE: 97.1 F | BODY MASS INDEX: 25.07 KG/M2

## 2022-03-22 DIAGNOSIS — Z00.00 HEALTHCARE MAINTENANCE: ICD-10-CM

## 2022-03-22 DIAGNOSIS — I10 PRIMARY HYPERTENSION: Primary | ICD-10-CM

## 2022-03-22 DIAGNOSIS — I63.9 CEREBROVASCULAR ACCIDENT (CVA), UNSPECIFIED MECHANISM (HCC): ICD-10-CM

## 2022-03-22 PROCEDURE — G8420 CALC BMI NORM PARAMETERS: HCPCS | Performed by: INTERNAL MEDICINE

## 2022-03-22 PROCEDURE — 99212 OFFICE O/P EST SF 10 MIN: CPT | Performed by: INTERNAL MEDICINE

## 2022-03-22 PROCEDURE — 99213 OFFICE O/P EST LOW 20 MIN: CPT | Performed by: INTERNAL MEDICINE

## 2022-03-22 PROCEDURE — G8484 FLU IMMUNIZE NO ADMIN: HCPCS | Performed by: INTERNAL MEDICINE

## 2022-03-22 PROCEDURE — 1036F TOBACCO NON-USER: CPT | Performed by: INTERNAL MEDICINE

## 2022-03-22 PROCEDURE — G8427 DOCREV CUR MEDS BY ELIG CLIN: HCPCS | Performed by: INTERNAL MEDICINE

## 2022-03-22 RX ORDER — ATORVASTATIN CALCIUM 40 MG/1
40 TABLET, FILM COATED ORAL NIGHTLY
Qty: 30 TABLET | Status: CANCELLED | OUTPATIENT
Start: 2022-03-22

## 2022-03-22 RX ORDER — AMLODIPINE BESYLATE 10 MG/1
10 TABLET ORAL DAILY
Qty: 30 TABLET | Status: CANCELLED | OUTPATIENT
Start: 2022-03-22

## 2022-03-22 RX ORDER — HYDRALAZINE HYDROCHLORIDE 50 MG/1
50 TABLET, FILM COATED ORAL EVERY 8 HOURS SCHEDULED
Qty: 90 TABLET | Status: CANCELLED | OUTPATIENT
Start: 2022-03-22

## 2022-03-22 RX ORDER — AMLODIPINE BESYLATE 10 MG/1
10 TABLET ORAL DAILY
Qty: 30 TABLET | Refills: 0 | Status: SHIPPED
Start: 2022-03-22 | End: 2022-06-11 | Stop reason: SDUPTHER

## 2022-03-22 RX ORDER — ASPIRIN 81 MG/1
81 TABLET, CHEWABLE ORAL DAILY
Qty: 30 TABLET | Status: CANCELLED | OUTPATIENT
Start: 2022-03-22

## 2022-03-22 RX ORDER — HYDRALAZINE HYDROCHLORIDE 50 MG/1
50 TABLET, FILM COATED ORAL EVERY 8 HOURS SCHEDULED
Qty: 90 TABLET | Refills: 3 | Status: SHIPPED
Start: 2022-03-22 | End: 2022-06-11 | Stop reason: SDUPTHER

## 2022-03-22 RX ORDER — HYDROCHLOROTHIAZIDE 25 MG/1
25 TABLET ORAL DAILY
Qty: 30 TABLET | Refills: 0 | Status: SHIPPED
Start: 2022-03-22 | End: 2022-06-11 | Stop reason: SDUPTHER

## 2022-03-22 RX ORDER — ATORVASTATIN CALCIUM 40 MG/1
40 TABLET, FILM COATED ORAL NIGHTLY
Qty: 30 TABLET | Refills: 0 | Status: SHIPPED
Start: 2022-03-22 | End: 2022-07-12 | Stop reason: SDUPTHER

## 2022-03-22 RX ORDER — HYDROCHLOROTHIAZIDE 25 MG/1
25 TABLET ORAL DAILY
Qty: 30 TABLET | Status: CANCELLED | OUTPATIENT
Start: 2022-03-22

## 2022-03-22 NOTE — PROGRESS NOTES
Patient verbalized understanding of office instructions. He will call with questions or concerns. Pt was given discharge instructions, and scripts for Lab work to be done. All questions were fully answered.   Printed AVS was given

## 2022-03-22 NOTE — PROGRESS NOTES
[address]  Internal Medicine Residency Program  [facility] Note      SUBJECTIVE:  CC: had concerns including Hypertension. HPI:  Mary Ellen Hurtado is a 35 y.o.male with PMH of subacute stroke, CKD, essential HTN  presenting to [facility] for 3 monthly follow up    -last seen in clinic by me on 11/02/2021, for hospital follow up after sub-acute stroke: inferior aspect of left corona radiata, cause: likely hypertension/hypertensive emergency. (hypercoagulable panel: negative, no significant carotid artery stenosis)  -was supposed to follow up closely to monitor blood pressure; but did not keep that appointment in November. Neurology appointment on 11/04 was also not kept. Plavix gave him GI side effects, only took for about 7-8 days, was required to take it for 21 days.  -However, no neurological deficits currently, feels great, is working out on the treadmill, intermittent fasting, and losing weight.  -Hydralazine: only taking the pills twice a day instead of 3x    Review of Systems   Constitutional: Negative for activity change, appetite change, chills, diaphoresis, fatigue, fever and unexpected weight change. Losing about 0.5-1 pound per day on average; but exercising regularly and following intermittent fasting. HENT: Negative for postnasal drip, rhinorrhea, sinus pain, sneezing and sore throat. Eyes: Negative for pain and discharge. Respiratory: Negative for apnea, cough, choking, chest tightness and shortness of breath. Cardiovascular: Negative for chest pain, palpitations and leg swelling. Gastrointestinal: Negative for abdominal distention, abdominal pain, anal bleeding, blood in stool, constipation and diarrhea. Endocrine: Negative for polydipsia, polyphagia and polyuria. Genitourinary: Negative for dysuria and enuresis. Musculoskeletal: Negative for arthralgias and back pain. Skin: Negative.     Neurological: Negative for dizziness, tremors, syncope, facial asymmetry, speech difficulty, weakness, light-headedness, numbness and headaches. Psychiatric/Behavioral: Negative. Outpatient Medications Marked as Taking for the 3/22/22 encounter (Office Visit) with Mayo Nazario MD   Medication Sig Dispense Refill    hydroCHLOROthiazide (HYDRODIURIL) 25 MG tablet Take 1 tablet by mouth daily 30 tablet 0    hydrALAZINE (APRESOLINE) 50 MG tablet Take 1 tablet by mouth every 8 hours 90 tablet 3    atorvastatin (LIPITOR) 40 MG tablet Take 1 tablet by mouth nightly 30 tablet 0    amLODIPine (NORVASC) 10 MG tablet Take 1 tablet by mouth daily 30 tablet 0    aspirin 81 MG chewable tablet Take 1 tablet by mouth daily 30 tablet 3       OBJECTIVE:    VS:   /78 (Site: Right Upper Arm, Position: Sitting)   Pulse 84   Temp 97.1 °F (36.2 °C) (Temporal)   Resp 18   Ht 5' 11\" (1.803 m)   Wt 179 lb 1.6 oz (81.2 kg)   SpO2 99%   BMI 24.98 kg/m²     EXAM:  Physical Exam  Constitutional:       General: He is not in acute distress. HENT:      Head: Normocephalic. Mouth/Throat:      Mouth: Mucous membranes are moist.      Pharynx: No oropharyngeal exudate or posterior oropharyngeal erythema. Eyes:      General: No scleral icterus. Conjunctiva/sclera: Conjunctivae normal.      Pupils: Pupils are equal, round, and reactive to light. Cardiovascular:      Rate and Rhythm: Normal rate. Pulses: Normal pulses. Heart sounds: Normal heart sounds. No murmur heard. Pulmonary:      Effort: Pulmonary effort is normal.      Breath sounds: Normal breath sounds. Abdominal:      Palpations: Abdomen is soft. Skin:     General: Skin is warm. Neurological:      General: No focal deficit present. Mental Status: He is alert and oriented to person, place, and time. Comments: Bilateral equal strength UE/LE, no sensory loss.    Psychiatric:         Mood and Affect: Mood normal.         Behavior: Behavior normal.         ASSESSMENT/PLAN:  I have reviewed all pertinent PMH, PSH, FH, SH, medications and allergies and updated history as appropriate. 1. HTN:  -current assessment possibly essential, vs secondary/resistant if remains uncontrolled with these 3 agents at maximal doses, or requires a 4th agent.  -Continue HCTZ 25mg daily, Amlodipine 10mg daily, and hydralazine 50mg 3x daily.  -Patient was taking hydralazine 2x daily, agreeable to start taking 3x day. 2. Subacute left corona radiata stroke:  -Continue aspirin and high intensity Lipitor 40mg daily; no neuro deficits currently    3. Hyperlipidemia:  Last lipid panel: 10/24/2021: 220/T/HDL: 61/LDL: 150  -Goal LDL remains <70, continue lipitor 40     4. Healthcare maintenance:  -Screening: HIV 1&2, Hep C: sent today  -Vaccines: refused flu/COVID, \"is considering but not right now\"    Patrick Mendez was seen today for hypertension. Diagnoses and all orders for this visit:    Primary hypertension  -     hydroCHLOROthiazide (HYDRODIURIL) 25 MG tablet; Take 1 tablet by mouth daily  -     hydrALAZINE (APRESOLINE) 50 MG tablet; Take 1 tablet by mouth every 8 hours  -     atorvastatin (LIPITOR) 40 MG tablet; Take 1 tablet by mouth nightly  -     amLODIPine (NORVASC) 10 MG tablet; Take 1 tablet by mouth daily    Healthcare maintenance  -     Basic Metabolic Panel; Future  -     Hepatitis C Antibody; Future  -     LIPID PANEL; Future    Cerebrovascular accident (CVA), unspecified mechanism ([address]  -     LIPID PANEL; Future        RTC: 1 month for BP check with increasing hydralazine to 3x daily    To be followed on the next visit:  -BP with proper dosage of hydralazine (3x day)  -Lipid panel (last one was 10/2021, expect a 30-50% decline in TG and/or LDL, to ensure compliance)  -BMP, assess whether ADRIANA has resolved since in-patient he had hypertensive emergency vs CKD. If has not resolved, please get urine micro/urine protein -creatinine ratios.     I have reviewed my findings and recommendations with Benedict Rico and

## 2022-03-22 NOTE — PROGRESS NOTES
[facility] [address]  Internal Medicine Residency Clinic    Attending Physician Statement  I have discussed the case, including pertinent history and exam findings with the resident physician. I agree with the assessment, plan and orders as documented by the resident. I have reviewed all pertinent PMHx, PSHx, FamHx, SocialHx, medications, and allergies and updated history as appropriate. Patient presents for routine follow up of medical problems. Subacute CVA last year, has been on plavix and ASA, taken plavix only 1. .5 week due to GI SE. Hypertension has been controlled with amlodipine hydrodiuril, hydralazine BID instead of TID, recommended to continue monitor BP at home, neurological examination was normal in this visit. DC plavix and need to follow up BP in few weeks,  Last BMP showed ADRIANA and needs to repeat BMP. Total time spent 25 minutes in this visit. Remainder of medical problems as per resident note.     Dorinda Salvador MD  3/22/2022 9:42 AM

## 2022-03-23 ASSESSMENT — ENCOUNTER SYMPTOMS
CHOKING: 0
SORE THROAT: 0
SINUS PAIN: 0
COUGH: 0
ABDOMINAL PAIN: 0
ABDOMINAL DISTENTION: 0
DIARRHEA: 0
CONSTIPATION: 0
SHORTNESS OF BREATH: 0
BLOOD IN STOOL: 0
CHEST TIGHTNESS: 0
APNEA: 0
BACK PAIN: 0
RHINORRHEA: 0
EYE PAIN: 0
EYE DISCHARGE: 0
ANAL BLEEDING: 0

## 2022-04-27 PROBLEM — I10 PRIMARY HYPERTENSION: Status: ACTIVE | Noted: 2022-04-27

## 2022-04-27 PROBLEM — E78.5 HYPERLIPIDEMIA: Status: ACTIVE | Noted: 2022-04-27

## 2022-04-27 PROBLEM — I63.9 CVA (CEREBRAL VASCULAR ACCIDENT) (HCC): Status: ACTIVE | Noted: 2022-04-27

## 2022-04-29 ENCOUNTER — TELEPHONE (OUTPATIENT)
Dept: INTERNAL MEDICINE | Age: 42
End: 2022-04-29

## 2022-04-29 ENCOUNTER — ANTI-COAG VISIT (OUTPATIENT)
Dept: INTERNAL MEDICINE | Age: 42
End: 2022-04-29

## 2022-06-11 ENCOUNTER — HOSPITAL ENCOUNTER (EMERGENCY)
Age: 42
Discharge: HOME | End: 2022-06-11
Attending: EMERGENCY MEDICINE
Payer: MEDICARE

## 2022-06-11 VITALS
WEIGHT: 184 LBS | TEMPERATURE: 97.5 F | OXYGEN SATURATION: 100 % | HEIGHT: 71 IN | SYSTOLIC BLOOD PRESSURE: 205 MMHG | HEART RATE: 89 BPM | BODY MASS INDEX: 25.76 KG/M2 | DIASTOLIC BLOOD PRESSURE: 107 MMHG | RESPIRATION RATE: 18 BRPM

## 2022-06-11 DIAGNOSIS — R36.1 BLOOD IN SEMEN: ICD-10-CM

## 2022-06-11 DIAGNOSIS — Z76.89 ENCOUNTER FOR ASSESSMENT OF STD EXPOSURE: Primary | ICD-10-CM

## 2022-06-11 DIAGNOSIS — I10 PRIMARY HYPERTENSION: ICD-10-CM

## 2022-06-11 LAB
BACTERIA: NORMAL /HPF
BILIRUBIN URINE: NEGATIVE
BLOOD, URINE: NEGATIVE
CLARITY: CLEAR
COLOR: NORMAL
EPITHELIAL CELLS, UA: NORMAL /HPF
GLUCOSE URINE: NEGATIVE MG/DL
KETONES, URINE: NEGATIVE MG/DL
LEUKOCYTE ESTERASE, URINE: NEGATIVE
NITRITE, URINE: NEGATIVE
PH UA: 6.5 (ref 5–9)
PROTEIN UA: NEGATIVE MG/DL
RBC UA: NORMAL /HPF (ref 0–2)
SPECIFIC GRAVITY UA: 1.01 (ref 1–1.03)
UROBILINOGEN, URINE: 0.2 E.U./DL
WBC UA: NORMAL /HPF (ref 0–5)

## 2022-06-11 PROCEDURE — 87491 CHLMYD TRACH DNA AMP PROBE: CPT

## 2022-06-11 PROCEDURE — 87088 URINE BACTERIA CULTURE: CPT

## 2022-06-11 PROCEDURE — 96372 THER/PROPH/DIAG INJ SC/IM: CPT

## 2022-06-11 PROCEDURE — 6360000002 HC RX W HCPCS

## 2022-06-11 PROCEDURE — 81001 URINALYSIS AUTO W/SCOPE: CPT

## 2022-06-11 PROCEDURE — 6370000000 HC RX 637 (ALT 250 FOR IP)

## 2022-06-11 PROCEDURE — 99284 EMERGENCY DEPT VISIT MOD MDM: CPT

## 2022-06-11 PROCEDURE — 87591 N.GONORRHOEAE DNA AMP PROB: CPT

## 2022-06-11 RX ORDER — AMLODIPINE BESYLATE 5 MG/1
10 TABLET ORAL ONCE
Status: COMPLETED | OUTPATIENT
Start: 2022-06-11 | End: 2022-06-11

## 2022-06-11 RX ORDER — AMLODIPINE BESYLATE 10 MG/1
10 TABLET ORAL DAILY
Qty: 30 TABLET | Refills: 0 | Status: SHIPPED | OUTPATIENT
Start: 2022-06-11 | End: 2022-07-12 | Stop reason: SDUPTHER

## 2022-06-11 RX ORDER — CEFTRIAXONE 1 G/1
500 INJECTION, POWDER, FOR SOLUTION INTRAMUSCULAR; INTRAVENOUS ONCE
Status: COMPLETED | OUTPATIENT
Start: 2022-06-11 | End: 2022-06-11

## 2022-06-11 RX ORDER — DOXYCYCLINE HYCLATE 100 MG/1
100 CAPSULE ORAL ONCE
Status: COMPLETED | OUTPATIENT
Start: 2022-06-11 | End: 2022-06-11

## 2022-06-11 RX ORDER — HYDROCHLOROTHIAZIDE 12.5 MG/1
25 CAPSULE, GELATIN COATED ORAL ONCE
Status: DISCONTINUED | OUTPATIENT
Start: 2022-06-11 | End: 2022-06-11

## 2022-06-11 RX ORDER — HYDRALAZINE HYDROCHLORIDE 50 MG/1
50 TABLET, FILM COATED ORAL ONCE
Status: DISCONTINUED | OUTPATIENT
Start: 2022-06-11 | End: 2022-06-11 | Stop reason: HOSPADM

## 2022-06-11 RX ORDER — HYDRALAZINE HYDROCHLORIDE 50 MG/1
50 TABLET, FILM COATED ORAL EVERY 8 HOURS SCHEDULED
Qty: 90 TABLET | Refills: 3 | Status: SHIPPED | OUTPATIENT
Start: 2022-06-11

## 2022-06-11 RX ORDER — HYDROCHLOROTHIAZIDE 25 MG/1
25 TABLET ORAL DAILY
Qty: 30 TABLET | Refills: 0 | Status: SHIPPED | OUTPATIENT
Start: 2022-06-11 | End: 2022-07-12 | Stop reason: SDUPTHER

## 2022-06-11 RX ORDER — DOXYCYCLINE HYCLATE 100 MG
100 TABLET ORAL 2 TIMES DAILY
Qty: 14 TABLET | Refills: 0 | Status: SHIPPED | OUTPATIENT
Start: 2022-06-11 | End: 2022-06-18

## 2022-06-11 RX ADMIN — CEFTRIAXONE 500 MG: 1 INJECTION, POWDER, FOR SOLUTION INTRAMUSCULAR; INTRAVENOUS at 06:56

## 2022-06-11 RX ADMIN — AMLODIPINE BESYLATE 10 MG: 5 TABLET ORAL at 06:46

## 2022-06-11 RX ADMIN — DOXYCYCLINE HYCLATE 100 MG: 100 CAPSULE ORAL at 06:46

## 2022-06-11 ASSESSMENT — ENCOUNTER SYMPTOMS
EYE DISCHARGE: 0
WHEEZING: 0
TROUBLE SWALLOWING: 0
COUGH: 0
ABDOMINAL PAIN: 0
BACK PAIN: 0
SHORTNESS OF BREATH: 0
EYE REDNESS: 0
VOMITING: 0
RHINORRHEA: 0
PHOTOPHOBIA: 0
SORE THROAT: 0
VOICE CHANGE: 0
DIARRHEA: 0
NAUSEA: 0

## 2022-06-11 NOTE — ED PROVIDER NOTES
35y.o. year old male presenting to the emergency room with concerns of red discharge semen beginning yesterday. Patient reports that symptom's onset yesterday. Worsen with nothing. Improves with nothing. Severity of mild, with no radiation. Symptoms are constant in timing. Symptoms described asNOTHING red colored semen x 2. Patient reports  associated symptoms of . Patient in  no acute distress. Patient reports new sexual contact on Thursday, 2 total new contacts since last STD check, One incident of chlamydia at 23years old. Did not use condoms during new sexual contacts     Chief Complaint   Patient presents with    Exposure to STD     states \"i noticed that there was red in my semen twice. I think I was exposed to something. \"       Review of Systems   Constitutional: Negative for chills, fatigue and fever. HENT: Negative for congestion, rhinorrhea, sore throat, trouble swallowing and voice change. Eyes: Negative for photophobia, discharge, redness and visual disturbance. Respiratory: Negative for cough, shortness of breath and wheezing. Cardiovascular: Negative for chest pain and palpitations. Gastrointestinal: Negative for abdominal pain, diarrhea, nausea and vomiting. Genitourinary: Positive for penile discharge. Negative for dysuria, frequency, penile pain, penile swelling and urgency. Musculoskeletal: Negative for arthralgias, back pain and neck pain. Skin: Negative for rash and wound. Neurological: Negative for dizziness, syncope, weakness, numbness and headaches. Psychiatric/Behavioral: Negative for behavioral problems and confusion. The patient is not nervous/anxious. Physical Exam  Vitals reviewed. Exam conducted with a chaperone present. Constitutional:       General: He is not in acute distress. Appearance: Normal appearance. HENT:      Head: Normocephalic.       Right Ear: External ear normal.      Left Ear: External ear normal.      Nose: Nose normal. Mouth/Throat:      Mouth: Mucous membranes are moist.   Eyes:      General:         Right eye: No discharge. Left eye: No discharge. Conjunctiva/sclera: Conjunctivae normal.      Pupils: Pupils are equal, round, and reactive to light. Cardiovascular:      Rate and Rhythm: Normal rate and regular rhythm. Heart sounds: No friction rub. Pulmonary:      Effort: Pulmonary effort is normal. No respiratory distress. Breath sounds: No stridor. Abdominal:      General: There is no distension. Tenderness: There is no abdominal tenderness. There is no guarding or rebound. Hernia: There is no hernia in the left inguinal area or right inguinal area. Genitourinary:     Penis: No erythema, tenderness, discharge or swelling. Testes: Normal.         Right: Tenderness or swelling not present. Left: Tenderness or swelling not present. Epididymis:      Right: Not enlarged. Left: Not enlarged. Musculoskeletal:         General: No tenderness or deformity. Cervical back: Normal range of motion. No rigidity or tenderness. Right lower leg: No edema. Left lower leg: No edema. Skin:     General: Skin is warm. Coloration: Skin is not jaundiced. Findings: No erythema. Neurological:      Mental Status: He is alert and oriented to person, place, and time. Sensory: No sensory deficit. Motor: No weakness. Psychiatric:         Mood and Affect: Mood normal.         Behavior: Behavior normal.          Procedures       MDM  Number of Diagnoses or Management Options  Blood in semen  Encounter for assessment of STD exposure  Diagnosis management comments: 35year old male presenting to the ER with concerns of STD exposure after noticing \"red semen\" x2. Patient with new sexual partner, no condom use on Thursday. Previous history of STD at 23years old. Chlamydia gonorrhea testing sent urinalysis collected.   Patient reports that he has not been taking his blood pressure medications for months. Dose of amlodipine, hydralazine given. We will plan to prophylactically treat with Rocephin and doxycycline x7 days and have patient follow-up with PCP and urology for further evaluation. Prescriptions for blood pressure medications given instructions to follow-up with PCP regarding blood pressure management. Patient stable at this time, return instructions given.                  --------------------------------------------- PAST HISTORY ---------------------------------------------  Past Medical History:  has no past medical history on file. Past Surgical History:  has no past surgical history on file. Social History:  reports that he has never smoked. He has never used smokeless tobacco. He reports that he does not drink alcohol and does not use drugs. Family History: family history is not on file. The patients home medications have been reviewed. Allergies: Patient has no known allergies.     -------------------------------------------------- RESULTS -------------------------------------------------  Labs:  Results for orders placed or performed during the hospital encounter of 06/11/22   C.trachomatis N.gonorrhoeae DNA, Urine    Specimen: Urine   Result Value Ref Range    Source Urine    Urinalysis with Microscopic   Result Value Ref Range    Color, UA Straw Straw/Yellow    Clarity, UA Clear Clear    Glucose, Ur Negative Negative mg/dL    Bilirubin Urine Negative Negative    Ketones, Urine Negative Negative mg/dL    Specific Gravity, UA 1.015 1.005 - 1.030    Blood, Urine Negative Negative    pH, UA 6.5 5.0 - 9.0    Protein, UA Negative Negative mg/dL    Urobilinogen, Urine 0.2 <2.0 E.U./dL    Nitrite, Urine Negative Negative    Leukocyte Esterase, Urine Negative Negative    WBC, UA NONE 0 - 5 /HPF    RBC, UA 0-1 0 - 2 /HPF    Epithelial Cells, UA NONE SEEN /HPF    Bacteria, UA NONE SEEN None Seen /HPF       Radiology:  No orders to display ------------------------- NURSING NOTES AND VITALS REVIEWED ---------------------------  Date / Time Roomed:  6/11/2022  5:46 AM  ED Bed Assignment:  23/23    The nursing notes within the ED encounter and vital signs as below have been reviewed. BP (!) 205/107   Pulse 89   Temp 97.5 °F (36.4 °C) (Temporal)   Resp 18   Ht 5' 11\" (1.803 m)   Wt 184 lb (83.5 kg)   SpO2 100%   BMI 25.66 kg/m²   Oxygen Saturation Interpretation: Normal      ------------------------------------------ PROGRESS NOTES ------------------------------------------  I have spoken with the patient and discussed todays results, in addition to providing specific details for the plan of care and counseling regarding the diagnosis and prognosis. Their questions are answered at this time and they are agreeable with the plan. I discussed at length with them reasons for immediate return here for re evaluation. They will followup with primary care by calling their office tomorrow. --------------------------------- ADDITIONAL PROVIDER NOTES ---------------------------------  At this time the patient is without objective evidence of an acute process requiring hospitalization or inpatient management. They have remained hemodynamically stable throughout their entire ED visit and are stable for discharge with outpatient follow-up. The plan has been discussed in detail and they are aware of the specific conditions for emergent return, as well as the importance of follow-up. New Prescriptions    DOXYCYCLINE HYCLATE (VIBRA-TABS) 100 MG TABLET    Take 1 tablet by mouth 2 times daily for 7 days       Diagnosis:  1. Encounter for assessment of STD exposure    2. Blood in semen    3. Primary hypertension        Disposition:  Patient's disposition: Discharge to home  Patient's condition is stable. Attending was present and available throughout encounter including all critical portions;  See Attending Note/Attestation for Final [facility], DO  Resident  06/11/22 [id number]

## 2022-06-11 NOTE — Clinical Note
Jovon Forte was seen and treated in our emergency department on 6/11/2022. He may return to work on 06/12/2022. If you have any questions or concerns, please don't hesitate to call.       Tatyana Tejeda, DO

## 2022-06-13 LAB — URINE CULTURE, ROUTINE: NORMAL

## 2022-06-14 LAB
C. TRACHOMATIS DNA ,URINE: NEGATIVE
N. GONORRHOEAE DNA, URINE: NEGATIVE
SOURCE: NORMAL

## 2022-07-12 DIAGNOSIS — I10 PRIMARY HYPERTENSION: ICD-10-CM

## 2022-07-12 RX ORDER — ATORVASTATIN CALCIUM 40 MG/1
40 TABLET, FILM COATED ORAL NIGHTLY
Qty: 30 TABLET | Refills: 0 | Status: SHIPPED
Start: 2022-07-12 | End: 2022-08-03 | Stop reason: SDUPTHER

## 2022-07-12 RX ORDER — AMLODIPINE BESYLATE 10 MG/1
10 TABLET ORAL DAILY
Qty: 30 TABLET | Refills: 0 | Status: SHIPPED
Start: 2022-07-12 | End: 2022-08-03 | Stop reason: SDUPTHER

## 2022-07-12 RX ORDER — HYDROCHLOROTHIAZIDE 25 MG/1
25 TABLET ORAL DAILY
Qty: 30 TABLET | Refills: 0 | Status: SHIPPED
Start: 2022-07-12 | End: 2022-08-03 | Stop reason: SDUPTHER

## 2022-07-12 NOTE — TELEPHONE ENCOUNTER
----- Message from Odessa Chase sent at 7/11/2022  3:09 PM EDT -----  Subject: Refill Request    QUESTIONS  Name of Medication? atorvastatin (LIPITOR) 40 MG tablet  Patient-reported dosage and instructions? 1 tablet nightly  How many days do you have left? 0  Preferred Pharmacy? [facility] [address] [employer]  phone number (if available)? [phone]  ---------------------------------------------------------------------------  --------------,  Name of Medication? amLODIPine (NORVASC) 10 MG tablet  Patient-reported dosage and instructions? 1 tablet by mouth once a day  How many days do you have left? 7  Preferred Pharmacy? [facility] [address] [facility] phone number (if available)? [phone]  ---------------------------------------------------------------------------  --------------,  Name of Medication? hydroCHLOROthiazide (HYDRODIURIL) 25 MG tablet  Patient-reported dosage and instructions? 1 tablet once daily  How many days do you have left? 6  Preferred Pharmacy? [facility] [address] [employer]  phone number (if available)? [phone]  ---------------------------------------------------------------------------  --------------  CALL BACK INFO  What is the best way for the office to contact you? OK to leave message on   voicemail  Preferred Call Back Phone Number? [phone]  ---------------------------------------------------------------------------  --------------  SCRIPT ANSWERS  Relationship to Patient?  Self

## 2022-08-03 ENCOUNTER — OFFICE VISIT (OUTPATIENT)
Dept: INTERNAL MEDICINE | Age: 42
End: 2022-08-03
Payer: MEDICARE

## 2022-08-03 VITALS
HEIGHT: 71 IN | WEIGHT: 187.4 LBS | BODY MASS INDEX: 26.23 KG/M2 | TEMPERATURE: 98.2 F | RESPIRATION RATE: 20 BRPM | DIASTOLIC BLOOD PRESSURE: 95 MMHG | HEART RATE: 81 BPM | SYSTOLIC BLOOD PRESSURE: 151 MMHG | OXYGEN SATURATION: 99 %

## 2022-08-03 DIAGNOSIS — Z00.00 HEALTHCARE MAINTENANCE: Primary | ICD-10-CM

## 2022-08-03 DIAGNOSIS — I10 PRIMARY HYPERTENSION: ICD-10-CM

## 2022-08-03 PROCEDURE — 99214 OFFICE O/P EST MOD 30 MIN: CPT | Performed by: INTERNAL MEDICINE

## 2022-08-03 PROCEDURE — 99212 OFFICE O/P EST SF 10 MIN: CPT | Performed by: INTERNAL MEDICINE

## 2022-08-03 PROCEDURE — G8419 CALC BMI OUT NRM PARAM NOF/U: HCPCS | Performed by: INTERNAL MEDICINE

## 2022-08-03 PROCEDURE — 1036F TOBACCO NON-USER: CPT | Performed by: INTERNAL MEDICINE

## 2022-08-03 PROCEDURE — G8427 DOCREV CUR MEDS BY ELIG CLIN: HCPCS | Performed by: INTERNAL MEDICINE

## 2022-08-03 RX ORDER — HYDRALAZINE HYDROCHLORIDE 50 MG/1
50 TABLET, FILM COATED ORAL EVERY 8 HOURS SCHEDULED
Qty: 90 TABLET | Refills: 1 | Status: CANCELLED | OUTPATIENT
Start: 2022-08-03

## 2022-08-03 RX ORDER — AMLODIPINE BESYLATE 10 MG/1
10 TABLET ORAL DAILY
Qty: 60 TABLET | Refills: 1 | Status: SHIPPED | OUTPATIENT
Start: 2022-08-03

## 2022-08-03 RX ORDER — HYDROCHLOROTHIAZIDE 25 MG/1
25 TABLET ORAL DAILY
Qty: 60 TABLET | Refills: 1 | Status: SHIPPED | OUTPATIENT
Start: 2022-08-03

## 2022-08-03 RX ORDER — ATORVASTATIN CALCIUM 40 MG/1
40 TABLET, FILM COATED ORAL NIGHTLY
Qty: 60 TABLET | Refills: 1 | Status: SHIPPED | OUTPATIENT
Start: 2022-08-03

## 2022-08-03 ASSESSMENT — ENCOUNTER SYMPTOMS
ABDOMINAL PAIN: 0
SHORTNESS OF BREATH: 0
BLOOD IN STOOL: 0
BACK PAIN: 0
SORE THROAT: 0
EYES NEGATIVE: 1
NAUSEA: 0
CONSTIPATION: 0
COUGH: 0
ABDOMINAL DISTENTION: 0
VOMITING: 0
DIARRHEA: 0
CHOKING: 0
WHEEZING: 0
CHEST TIGHTNESS: 0

## 2022-08-03 ASSESSMENT — PATIENT HEALTH QUESTIONNAIRE - PHQ9
1. LITTLE INTEREST OR PLEASURE IN DOING THINGS: 0
SUM OF ALL RESPONSES TO PHQ QUESTIONS 1-9: 0
SUM OF ALL RESPONSES TO PHQ QUESTIONS 1-9: 0
SUM OF ALL RESPONSES TO PHQ9 QUESTIONS 1 & 2: 0
2. FEELING DOWN, DEPRESSED OR HOPELESS: 0
SUM OF ALL RESPONSES TO PHQ QUESTIONS 1-9: 0
SUM OF ALL RESPONSES TO PHQ QUESTIONS 1-9: 0

## 2022-08-03 NOTE — PROGRESS NOTES
[facility]  [facility] Res[facility]    Attending Physician Statement  I have discussed the case, including pertinent history and exam findings with the resident physician. I agree with the assessment, plan and orders as documented by the resident. I have reviewed all pertinent PMHx, PSHx, FamHx, SocialHx, medications, and allergies and updated history as appropriate. Patient here for routine follow up of medical problems. ED visit 6/11/22 with concern for STI. Work up negative. He is currently asymptomatic  HTN remains above goal. He is having difficulty taking hydralazine regularly because of his schedule at work. States SBP at home 120s. Discussed importance of compliance with medication. Follow up in 4-5 weeks with PCP for BP check, need to bring log and BP cuff. ADRIANA in October - rpt BMP  Hx of CVA, on asa and statin    Remainder of medical problems as per resident note. Eddi Han MD  8/3/2022 8:54 AM

## 2022-08-03 NOTE — PATIENT INSTRUCTIONS
1. Please take Amlodipine 10mg daily, HCTZ 25 mg daily, and Hydralazine 50mg three times a day without fail. Please continue to take daily aspirin and lipitor as well. 2. We need better control of your blood pressure to ensure that we reduce your risk of another stroke or heart disease. 3. Please bring your BP cuff and BP readings the next time you are here in 4 weeks. 4. Please get the blood tests done TODAY.

## 2022-08-03 NOTE — PROGRESS NOTES
[facility]  Internal Medicine Residency Program  [facility] Note      SUBJECTIVE:  CC: had concerns including Follow-up, Hypertension, and Medication Refill. HPI:  Shannan Thompson is a 35 y.o.male with PMH of CVA, CKD, essential HTN presenting to [facility] for 3 monthly follow up    -interval development:  ED encounter for blood in semen, UA/Ucx: neg, chlamydia/gonorrhea neg, completed his doxycycline course. Currently symptom free; no dysuria, discharge    -still needs labs done from the last visit    -Diet: still drinking soda, 5 pound gain, exercise: gym membership  -possibly missing a few doses of hydralazine due to work; but has agreed to start taking his medication at work. Review of Systems   Constitutional:  Negative for activity change, appetite change, chills, diaphoresis and fatigue. HENT:  Negative for ear discharge, sneezing, sore throat and tinnitus. Eyes: Negative. Respiratory:  Negative for cough, choking, chest tightness, shortness of breath and wheezing. Cardiovascular:  Negative for chest pain, palpitations and leg swelling. Gastrointestinal:  Negative for abdominal distention, abdominal pain, blood in stool, constipation, diarrhea, nausea and vomiting. Endocrine: Negative. Genitourinary:  Negative for dysuria and enuresis. Musculoskeletal:  Negative for arthralgias and back pain. Skin:  Negative for pallor. Neurological:  Negative for dizziness, light-headedness and headaches. Psychiatric/Behavioral:  Negative for agitation. Outpatient Medications Marked as Taking for the 8/3/22 encounter (Office Visit) with Hayley Escobar MD   Medication Sig Dispense Refill    atorvastatin (LIPITOR) 40 MG tablet Take 1 tablet by mouth nightly 60 tablet 1    amLODIPine (NORVASC) 10 MG tablet Take 1 tablet by mouth in the morning. 60 tablet 1    hydroCHLOROthiazide (HYDRODIURIL) 25 MG tablet Take 1 tablet by mouth in the morning.  60 tablet 1    hydrALAZINE (APRESOLINE) 50 MG tablet Take 1 tablet by mouth every 8 hours 90 tablet 3    aspirin 81 MG chewable tablet Take 1 tablet by mouth daily 30 tablet 3       OBJECTIVE:    VS:   BP (!) 151/95 (Site: Left Upper Arm, Position: Sitting, Cuff Size: Large Adult)   Pulse 81   Temp 98.2 °F (36.8 °C) (Temporal)   Resp 20   Ht 5' 11\" (1.803 m)   Wt 187 lb 6.4 oz (85 kg)   SpO2 99%   BMI 26.14 kg/m²     EXAM:  Physical Exam  Constitutional:       Appearance: Normal appearance. HENT:      Head: Normocephalic. Mouth/Throat:      Pharynx: Oropharynx is clear. No oropharyngeal exudate or posterior oropharyngeal erythema. Eyes:      Conjunctiva/sclera: Conjunctivae normal.   Cardiovascular:      Rate and Rhythm: Normal rate and regular rhythm. Pulses: Normal pulses. Heart sounds: Normal heart sounds. Pulmonary:      Effort: Pulmonary effort is normal. No respiratory distress. Breath sounds: Normal breath sounds. No stridor. No wheezing. Abdominal:      Palpations: Abdomen is soft. Musculoskeletal:         General: Normal range of motion. Cervical back: Normal range of motion. Skin:     General: Skin is warm. Neurological:      Mental Status: He is alert and oriented to person, place, and time. ASSESSMENT/PLAN:  I have reviewed all pertinent PMH, PSH, FH, SH, medications and allergies and updated history as appropriate. 1. HTN:  -current assessment possibly essential, vs secondary/resistant if remains uncontrolled with these 3 agents at maximal doses, or requires a 4th agent.  -mentions BP has been normal at home, and only goes high at physicians office. Will bring the BP log the next time around with BP cuff  -Continue HCTZ 25mg daily, Amlodipine 10mg daily, and hydralazine 50mg 3x daily. 2. Subacute left corona radiata stroke:  -Continue aspirin and high intensity Lipitor 40mg daily;     3.  Hyperlipidemia:  -Last lipid panel: 10/24/2021: 220/T/HDL: 61/LDL: 150; repeat today  -Goal LDL remains <70, continue lipitor 40     4. Healthcare maintenance:  -Screening: HIV 1&2, Hep C: sent today  -BMP, lipid panel sent today  -Vaccines: refused flu/COVID    Dona Many was seen today for follow-up, hypertension and medication refill. Diagnoses and all orders for this visit:    Healthcare maintenance  -     Hepatitis C Antibody; Future  -     HIV 1/2 Antibody Multispot; Future  -     LIPID PANEL; Future    Primary hypertension  -     atorvastatin (LIPITOR) 40 MG tablet; Take 1 tablet by mouth nightly  -     amLODIPine (NORVASC) 10 MG tablet; Take 1 tablet by mouth in the morning.  -     hydroCHLOROthiazide (HYDRODIURIL) 25 MG tablet; Take 1 tablet by mouth in the morning.  -     Basic Metabolic Panel;  Future        RTC: in 4 weeks with PCP for BP check      I have reviewed my findings and recommendations with Scarlet Delvalle and Dr Chiquita Eaton MD PGY-3   8/3/2022 4:50 PM

## 2022-11-15 ENCOUNTER — OFFICE VISIT (OUTPATIENT)
Dept: INTERNAL MEDICINE | Age: 42
End: 2022-11-15
Payer: MEDICARE

## 2022-11-15 VITALS
HEIGHT: 71 IN | TEMPERATURE: 97.6 F | DIASTOLIC BLOOD PRESSURE: 107 MMHG | BODY MASS INDEX: 27.17 KG/M2 | WEIGHT: 194.1 LBS | HEART RATE: 81 BPM | SYSTOLIC BLOOD PRESSURE: 175 MMHG | RESPIRATION RATE: 16 BRPM

## 2022-11-15 DIAGNOSIS — I10 PRIMARY HYPERTENSION: ICD-10-CM

## 2022-11-15 PROCEDURE — 3075F SYST BP GE 130 - 139MM HG: CPT | Performed by: CHIROPRACTOR

## 2022-11-15 PROCEDURE — 99213 OFFICE O/P EST LOW 20 MIN: CPT | Performed by: CHIROPRACTOR

## 2022-11-15 PROCEDURE — 1036F TOBACCO NON-USER: CPT | Performed by: CHIROPRACTOR

## 2022-11-15 PROCEDURE — G8484 FLU IMMUNIZE NO ADMIN: HCPCS | Performed by: CHIROPRACTOR

## 2022-11-15 PROCEDURE — 99212 OFFICE O/P EST SF 10 MIN: CPT | Performed by: CHIROPRACTOR

## 2022-11-15 PROCEDURE — 3078F DIAST BP <80 MM HG: CPT | Performed by: CHIROPRACTOR

## 2022-11-15 PROCEDURE — G8427 DOCREV CUR MEDS BY ELIG CLIN: HCPCS | Performed by: CHIROPRACTOR

## 2022-11-15 PROCEDURE — G8419 CALC BMI OUT NRM PARAM NOF/U: HCPCS | Performed by: CHIROPRACTOR

## 2022-11-15 RX ORDER — HYDRALAZINE HYDROCHLORIDE 50 MG/1
50 TABLET, FILM COATED ORAL EVERY 8 HOURS SCHEDULED
Qty: 270 TABLET | Refills: 1 | Status: SHIPPED | OUTPATIENT
Start: 2022-11-15

## 2022-11-15 RX ORDER — AMLODIPINE BESYLATE 10 MG/1
10 TABLET ORAL DAILY
Qty: 90 TABLET | Refills: 1 | Status: SHIPPED | OUTPATIENT
Start: 2022-11-15

## 2022-11-15 RX ORDER — ATORVASTATIN CALCIUM 40 MG/1
40 TABLET, FILM COATED ORAL NIGHTLY
Qty: 90 TABLET | Refills: 1 | Status: SHIPPED | OUTPATIENT
Start: 2022-11-15

## 2022-11-15 RX ORDER — ASPIRIN 81 MG/1
81 TABLET, CHEWABLE ORAL DAILY
Qty: 90 TABLET | Refills: 1 | Status: SHIPPED | OUTPATIENT
Start: 2022-11-15

## 2022-11-15 RX ORDER — HYDROCHLOROTHIAZIDE 25 MG/1
25 TABLET ORAL DAILY
Qty: 90 TABLET | Refills: 1 | Status: SHIPPED | OUTPATIENT
Start: 2022-11-15

## 2022-11-15 SDOH — ECONOMIC STABILITY: FOOD INSECURITY: WITHIN THE PAST 12 MONTHS, THE FOOD YOU BOUGHT JUST DIDN'T LAST AND YOU DIDN'T HAVE MONEY TO GET MORE.: NEVER TRUE

## 2022-11-15 SDOH — ECONOMIC STABILITY: INCOME INSECURITY: IN THE LAST 12 MONTHS, WAS THERE A TIME WHEN YOU WERE NOT ABLE TO PAY THE MORTGAGE OR RENT ON TIME?: NO

## 2022-11-15 SDOH — ECONOMIC STABILITY: HOUSING INSECURITY: IN THE LAST 12 MONTHS, HOW MANY PLACES HAVE YOU LIVED?: 1

## 2022-11-15 SDOH — ECONOMIC STABILITY: TRANSPORTATION INSECURITY
IN THE PAST 12 MONTHS, HAS THE LACK OF TRANSPORTATION KEPT YOU FROM MEDICAL APPOINTMENTS OR FROM GETTING MEDICATIONS?: NO

## 2022-11-15 SDOH — ECONOMIC STABILITY: TRANSPORTATION INSECURITY
IN THE PAST 12 MONTHS, HAS LACK OF TRANSPORTATION KEPT YOU FROM MEETINGS, WORK, OR FROM GETTING THINGS NEEDED FOR DAILY LIVING?: NO

## 2022-11-15 SDOH — ECONOMIC STABILITY: HOUSING INSECURITY
IN THE LAST 12 MONTHS, WAS THERE A TIME WHEN YOU DID NOT HAVE A STEADY PLACE TO SLEEP OR SLEPT IN A SHELTER (INCLUDING NOW)?: NO

## 2022-11-15 SDOH — ECONOMIC STABILITY: FOOD INSECURITY: WITHIN THE PAST 12 MONTHS, YOU WORRIED THAT YOUR FOOD WOULD RUN OUT BEFORE YOU GOT MONEY TO BUY MORE.: NEVER TRUE

## 2022-11-15 ASSESSMENT — LIFESTYLE VARIABLES: HOW OFTEN DO YOU HAVE A DRINK CONTAINING ALCOHOL: NEVER

## 2022-11-15 ASSESSMENT — ENCOUNTER SYMPTOMS
EYE DISCHARGE: 0
ABDOMINAL PAIN: 0
ABDOMINAL DISTENTION: 0
SORE THROAT: 0
NAUSEA: 0
WHEEZING: 0
EYE REDNESS: 0
SHORTNESS OF BREATH: 0
APNEA: 0
EYE PAIN: 0
COUGH: 0
BACK PAIN: 0
EYE ITCHING: 0
SINUS PAIN: 0
CHEST TIGHTNESS: 0
FACIAL SWELLING: 0
SINUS PRESSURE: 0
CONSTIPATION: 0
VOMITING: 0
DIARRHEA: 0

## 2022-11-15 ASSESSMENT — SOCIAL DETERMINANTS OF HEALTH (SDOH): HOW HARD IS IT FOR YOU TO PAY FOR THE VERY BASICS LIKE FOOD, HOUSING, MEDICAL CARE, AND HEATING?: NOT HARD AT ALL

## 2022-11-15 NOTE — PATIENT INSTRUCTIONS
Dear Lissa Ayala,        Thank you for coming to your appointment today. I hope we have addressed all of your needs. Please make sure to do the following:  - Continue your medications as listed. - please stop by tomorrow to calibrate the blood pressure cuff  - please log in your blood pressure at home for at least 2 weeks   - Get labs drawn before our next follow up. We will call you with the results     Have a great day!         Sincerely,  Michelle Funez MD  11/15/2022  11:28 AM

## 2022-11-15 NOTE — PROGRESS NOTES
[facility]  Internal Medicine Residency Program  [facility] Note      SUBJECTIVE:  CC: had no chief complaint listed for this encounter. HPI:  Ayanna Pereira is a 35 y.o.male with PMH of CVA, CKD, essential HTN  presenting to [facility] for 3 monthly follow-up. 1. HTN:  -current assessment possibly essential, vs secondary/resistant if remains uncontrolled with these 3 agents at maximal doses, or requires a 4th agent.  -mentions BP has been normal at home, and only goes high at physicians office. Will bring the BP log the next time around with BP cuff  -Continue HCTZ 25mg daily, Amlodipine 10mg daily, and hydralazine 50mg 3x daily. 2. Subacute left corona radiata stroke:  -Continue aspirin and high intensity Lipitor 40mg daily;     3. Hyperlipidemia:  -Last lipid panel: 10/24/2021: 220/T/HDL: 61/LDL: 150; repeat today  -Goal LDL remains <70, continue lipitor 40     4. Healthcare maintenance:  -Screening: HIV 1&2, Hep C: sent today  -BMP, lipid panel sent today  -Vaccines: refused flu/COVID    Review of Systems    No outpatient medications have been marked as taking for the 11/15/22 encounter (Appointment) with Fredrick Tanner MD.       OBJECTIVE:    VS:   There were no vitals taken for this visit. EXAM:  Physical Exam    ASSESSMENT/PLAN:  I have reviewed all pertinent PMH, PSH, FH, SH, medications and allergies and updated history as appropriate. 1. HTN:  -current assessment possibly essential, vs secondary/resistant if remains uncontrolled with these 3 agents at maximal doses, or requires a 4th agent.  -mentions BP has been normal at home, and only goes high at physicians office. Will bring the BP log the next time around with BP cuff  -Continue HCTZ 25mg daily, Amlodipine 10mg daily, and hydralazine 50mg 3x daily. 2. Subacute left corona radiata stroke:  -Continue aspirin and high intensity Lipitor 40mg daily;     3.  Hyperlipidemia:  -Last lipid panel: 10/24/2021: 220/T/HDL: 61/LDL: 150; repeat today  -Goal LDL remains <70, continue lipitor 40     4. Healthcare maintenance:  -Screening: HIV 1&2, Hep C: sent today  -BMP, lipid panel sent today  -Vaccines: refused flu/COVID    There are no diagnoses linked to this encounter.       RTC:      I have reviewed my findings and recommendations with Lissa Ayala and Dr Coby Kahtleen MD PGY-3   11/15/2022 10:37 AM

## 2022-11-15 NOTE — PROGRESS NOTES
[facility]  InternalMedicine Residency Program  [facility] Note      SUBJECTIVE:  CC: had no chief complaint listed for this encounter. HPI:Wilmer Liu presented to the [facility] for a routine visit. Mr. Emma Vance does not have any acute complaint today. He mentions he has been compliant with all the medications. Blood pressure was high in the office today but he mentions whenever he checks it at home, its been 130s to 140s. He also mention he took his medications this morning    Patient denies headache, blurry vision, fever, chills, recent change in weight, chest pain, palpitations, SOB, AGOSTO, cough, nausea, vomiting, diarrhea, constipation, abd pain, dysuria, hematuria, tingling, numbness, leg swelling. Review of Systems   Constitutional:  Negative for appetite change, chills, fatigue, fever and unexpected weight change. HENT:  Negative for congestion, ear pain, facial swelling, sinus pressure, sinus pain, sneezing and sore throat. Eyes:  Negative for pain, discharge, redness and itching. Respiratory:  Negative for apnea, cough, chest tightness, shortness of breath and wheezing. Cardiovascular:  Negative for chest pain, palpitations and leg swelling. Gastrointestinal:  Negative for abdominal distention, abdominal pain, constipation, diarrhea, nausea and vomiting. Genitourinary:  Negative for difficulty urinating and dysuria. Musculoskeletal:  Negative for back pain, myalgias and neck pain. Skin:  Negative for rash. Neurological:  Negative for dizziness, facial asymmetry, speech difficulty, weakness and headaches. Current Outpatient Medications on File Prior to Visit   Medication Sig Dispense Refill    atorvastatin (LIPITOR) 40 MG tablet Take 1 tablet by mouth nightly 60 tablet 1    amLODIPine (NORVASC) 10 MG tablet Take 1 tablet by mouth in the morning. 60 tablet 1    hydroCHLOROthiazide (HYDRODIURIL) 25 MG tablet Take 1 tablet by mouth in the morning.  60 tablet 1 hydrALAZINE (APRESOLINE) 50 MG tablet Take 1 tablet by mouth every 8 hours 90 tablet 3    Misc. Devices MISC 1 each by Does not apply route once as needed (BP check daily) Essential HTN I10 1 each 0    aspirin 81 MG chewable tablet Take 1 tablet by mouth daily 30 tablet 3     No current facility-administered medications on file prior to visit. OBJECTIVE:    VS: BP (!) 175/107   Pulse 81   Temp 97.6 °F (36.4 °C) (Temporal)   Resp 16   Ht 5' 11\" (1.803 m)   Wt 194 lb 1.6 oz (88 kg)   BMI 27.07 kg/m²   Physical Exam  Constitutional:       General: He is not in acute distress. HENT:      Head: Normocephalic. Right Ear: External ear normal.      Left Ear: External ear normal.      Nose: No congestion or rhinorrhea. Mouth/Throat:      Mouth: Mucous membranes are moist.   Eyes:      General:         Right eye: No discharge. Left eye: No discharge. Conjunctiva/sclera: Conjunctivae normal.   Cardiovascular:      Rate and Rhythm: Normal rate and regular rhythm. Heart sounds: No murmur heard. Pulmonary:      Effort: Pulmonary effort is normal. No respiratory distress. Breath sounds: Normal breath sounds. No wheezing, rhonchi or rales. Abdominal:      General: Bowel sounds are normal. There is no distension. Palpations: Abdomen is soft. There is no mass. Tenderness: There is no abdominal tenderness. There is no guarding. Musculoskeletal:      Cervical back: Neck supple. No tenderness. Right lower leg: No edema. Left lower leg: No edema. Lymphadenopathy:      Cervical: No cervical adenopathy. Skin:     General: Skin is warm. Coloration: Skin is not pale. Findings: No erythema or rash. Neurological:      General: No focal deficit present. Mental Status: He is alert and oriented to person, place, and time.    Psychiatric:         Mood and Affect: Mood normal.         Behavior: Behavior normal.       ASSESSMENT/PLAN:    HTN:  SBP in 170s  Patient reports adherence to the medications, also reports systolic blood pressure at home is usually in 130s to 140s    Plan  Bring the blood pressure cuff to the office tomorrow to make sure it is calibrated  Paper BP log given to the patient  Continue HCTZ 25mg daily, Amlodipine 10mg daily, and hydralazine 50mg 3x daily. Subacute left corona radiata stroke:  -Continue aspirin and high intensity Lipitor 40mg daily;      Hyperlipidemia:  Last lipid panel: 10/24/2021: 220/T/HDL: 61/LDL: 150    HCM  Refused flu vaccination  HIV and hep C    RTC: Nursing visit tomorrow    I have reviewed my findings and recommendations with Parish Pinzon and Dr Hernan Gambino MD, PGY-2  11/15/2022 10:53 AM

## 2022-11-15 NOTE — PROGRESS NOTES
[facility]  [facility]    Attending Physician Statement  I have discussed the case, including pertinent history and exam findings with the resident physician. I agree with the assessment, plan and orders as documented by the resident. I have reviewed all pertinent PMHx, PSHx, FamHx, SocialHx, medications, and allergies and updated history as appropriate. Patient presents for routine follow up of medical problems. Pt presented for medication renewal.  BP today 175/107, pt reported his BP home measurement has been normal, needs to bring BP cuff in a day or two for comparison of BP.  CVA. On ASA and statin, pt refused flu vaccine today. Total time spent 20 minutes. Remainder of medical problems as per resident note.     Davian Bagley MD  11/15/2022 11:20 AM

## 2023-07-01 DIAGNOSIS — I10 PRIMARY HYPERTENSION: ICD-10-CM

## 2023-07-03 RX ORDER — ATORVASTATIN CALCIUM 40 MG/1
40 TABLET, FILM COATED ORAL NIGHTLY
Qty: 90 TABLET | Refills: 1 | OUTPATIENT
Start: 2023-07-03

## 2023-07-03 NOTE — TELEPHONE ENCOUNTER
Last Appointment:  11/15/2022  Future Appointments   Date Time Provider [address]   7/25/2023 10:45 AM Louie Mariano MD [facility]

## 2023-07-13 DIAGNOSIS — I10 PRIMARY HYPERTENSION: ICD-10-CM

## 2023-07-13 RX ORDER — HYDRALAZINE HYDROCHLORIDE 50 MG/1
50 TABLET, FILM COATED ORAL EVERY 8 HOURS SCHEDULED
Qty: 270 TABLET | Refills: 0 | Status: SHIPPED | OUTPATIENT
Start: 2023-07-13

## 2023-07-29 DIAGNOSIS — I10 PRIMARY HYPERTENSION: ICD-10-CM

## 2023-07-31 ENCOUNTER — TELEPHONE (OUTPATIENT)
Dept: INTERNAL MEDICINE | Age: 43
End: 2023-07-31

## 2023-07-31 RX ORDER — AMLODIPINE BESYLATE 10 MG/1
TABLET ORAL
Qty: 90 TABLET | Refills: 1 | OUTPATIENT
Start: 2023-07-31

## 2023-07-31 NOTE — TELEPHONE ENCOUNTER
Called pt to move his appt sooner than 08/30/2023 due to refill request but pt stated that he does not need any refills and will keep the appt that is scheduled for him

## 2023-08-27 ASSESSMENT — ENCOUNTER SYMPTOMS
SORE THROAT: 0
EYE REDNESS: 0
SINUS PRESSURE: 0
CHEST TIGHTNESS: 0
DIARRHEA: 0
COUGH: 0
SINUS PAIN: 0
EYE ITCHING: 0
ABDOMINAL DISTENTION: 0
NAUSEA: 0
EYE DISCHARGE: 0
FACIAL SWELLING: 0
CONSTIPATION: 0
SHORTNESS OF BREATH: 0
WHEEZING: 0
ABDOMINAL PAIN: 0
APNEA: 0
BACK PAIN: 0
VOMITING: 0
EYE PAIN: 0

## 2023-08-30 ENCOUNTER — OFFICE VISIT (OUTPATIENT)
Dept: INTERNAL MEDICINE | Age: 43
End: 2023-08-30

## 2023-08-30 VITALS
TEMPERATURE: 98.6 F | RESPIRATION RATE: 18 BRPM | HEIGHT: 71 IN | DIASTOLIC BLOOD PRESSURE: 89 MMHG | HEART RATE: 96 BPM | OXYGEN SATURATION: 98 % | SYSTOLIC BLOOD PRESSURE: 153 MMHG | BODY MASS INDEX: 28.42 KG/M2 | WEIGHT: 203 LBS

## 2023-08-30 DIAGNOSIS — Z11.59 NEED FOR HEPATITIS C SCREENING TEST: Primary | ICD-10-CM

## 2023-08-30 DIAGNOSIS — I10 PRIMARY HYPERTENSION: ICD-10-CM

## 2023-08-30 PROCEDURE — 99212 OFFICE O/P EST SF 10 MIN: CPT | Performed by: CHIROPRACTOR

## 2023-08-30 PROCEDURE — 3078F DIAST BP <80 MM HG: CPT | Performed by: CHIROPRACTOR

## 2023-08-30 PROCEDURE — 99213 OFFICE O/P EST LOW 20 MIN: CPT | Performed by: CHIROPRACTOR

## 2023-08-30 PROCEDURE — 3074F SYST BP LT 130 MM HG: CPT | Performed by: CHIROPRACTOR

## 2023-08-30 RX ORDER — HYDRALAZINE HYDROCHLORIDE 50 MG/1
50 TABLET, FILM COATED ORAL EVERY 8 HOURS SCHEDULED
Qty: 270 TABLET | Refills: 1 | Status: SHIPPED | OUTPATIENT
Start: 2023-08-30

## 2023-08-30 RX ORDER — ASPIRIN 81 MG/1
81 TABLET, CHEWABLE ORAL DAILY
Qty: 90 TABLET | Refills: 1 | Status: SHIPPED | OUTPATIENT
Start: 2023-08-30

## 2023-08-30 RX ORDER — HYDROCHLOROTHIAZIDE 25 MG/1
25 TABLET ORAL DAILY
Qty: 90 TABLET | Refills: 1 | Status: SHIPPED | OUTPATIENT
Start: 2023-08-30

## 2023-08-30 RX ORDER — AMLODIPINE BESYLATE 10 MG/1
10 TABLET ORAL DAILY
Qty: 90 TABLET | Refills: 1 | Status: SHIPPED | OUTPATIENT
Start: 2023-08-30

## 2023-08-30 RX ORDER — ATORVASTATIN CALCIUM 40 MG/1
40 TABLET, FILM COATED ORAL NIGHTLY
Qty: 90 TABLET | Refills: 1 | Status: SHIPPED | OUTPATIENT
Start: 2023-08-30

## 2023-08-30 ASSESSMENT — PATIENT HEALTH QUESTIONNAIRE - PHQ9
SUM OF ALL RESPONSES TO PHQ QUESTIONS 1-9: 0
1. LITTLE INTEREST OR PLEASURE IN DOING THINGS: 0
SUM OF ALL RESPONSES TO PHQ QUESTIONS 1-9: 0
2. FEELING DOWN, DEPRESSED OR HOPELESS: 0
SUM OF ALL RESPONSES TO PHQ QUESTIONS 1-9: 0
SUM OF ALL RESPONSES TO PHQ QUESTIONS 1-9: 0
SUM OF ALL RESPONSES TO PHQ9 QUESTIONS 1 & 2: 0

## 2023-08-30 NOTE — PROGRESS NOTES
[facility]  Internal Medicine Residency Clinic    Attending Physician Statement  I have discussed the case, including pertinent history and exam findings with the resident physician. I agree with the assessment, plan and orders as documented by the resident. I have reviewed all pertinent PMHx, PSHx, FamHx, SocialHx, medications, and allergies and updated history as appropriate. Patient here for routine follow up of medical problems. HTN  -uncontrolled 2/2 being out of medications   -refilling medications today; blood work ordered     HLD: continue statin 2/2 hx of stroke; continue ASA 2/2 stroke   -repeat lipid but patient has not had it drawn   The ASCVD Risk score (Dieudonne MARIA, et al., 2019) failed to calculate for the following reasons: The patient has a prior MI or stroke diagnosis    HCM  -blood work and screening labs   -patient deferring vaccinations     Remainder of medical problems as per resident note.     Virginie Soriano DO  8/30/2023 1:45 PM
Behavior: Behavior normal.       ASSESSMENT/PLAN:    Primary HTN: uncontrolled   Continue HCTZ 25mg daily, Amlodipine 10mg daily, and hydralazine 50mg TID. Didn't take his medications for the last 5 days    Resume the current regimen    Subacute left corona radiata stroke:   Continue aspirin and high intensity Lipitor 40mg daily;   No residual weakness    Hyperlipidemia:  Last lipid panel: 10/24/2021: 220/T/HDL: 61/LDL: 150  On a statin  Repeat lipid panel    HCM  Refused vaccination  hep C    RTC: 5 months PCP    I have reviewed my findings and recommendations with Justin Fuller and Dr Randi Crain MD, PGY-3  2023 1:35 PM

## 2023-08-30 NOTE — PATIENT INSTRUCTIONS
Dear Justin Fuller,        Thank you for coming to your appointment today. I hope we have addressed all of your needs. Please make sure to do the following:  - Continue your medications as listed. - Get labs drawn before our next follow up. We will call you with the results     - We will see each other again in 5 months        Have a great day!         Sincerely,  Kendra Walker MD  8/30/2023  1:48 PM

## 2024-03-06 DIAGNOSIS — I10 PRIMARY HYPERTENSION: ICD-10-CM

## 2024-03-06 RX ORDER — ASPIRIN 81 MG
81 TABLET,CHEWABLE ORAL DAILY
Qty: 90 TABLET | Refills: 1 | OUTPATIENT
Start: 2024-03-06

## 2024-03-06 RX ORDER — AMLODIPINE BESYLATE 10 MG/1
10 TABLET ORAL DAILY
Qty: 90 TABLET | Refills: 1 | OUTPATIENT
Start: 2024-03-06

## 2024-04-01 DIAGNOSIS — I10 PRIMARY HYPERTENSION: ICD-10-CM

## 2024-04-01 NOTE — TELEPHONE ENCOUNTER
Called pt  and scheduled an appt for next week, pt last seen 8/2023. Pt requesting refills until appt.

## 2024-04-02 RX ORDER — ASPIRIN 81 MG
81 TABLET,CHEWABLE ORAL DAILY
Qty: 30 TABLET | Refills: 0 | Status: SHIPPED | OUTPATIENT
Start: 2024-04-02

## 2024-04-02 RX ORDER — AMLODIPINE BESYLATE 10 MG/1
10 TABLET ORAL DAILY
Qty: 30 TABLET | Refills: 0 | Status: SHIPPED | OUTPATIENT
Start: 2024-04-02

## 2024-04-02 NOTE — TELEPHONE ENCOUNTER
30 day supply with no refills approved. If patient does not show, we will require in-person visit before any further refills are written. Thank you

## 2024-04-09 NOTE — PROGRESS NOTES
[facility]  Internal Medicine Residency Program  ACC Note      SUBJECTIVE:  CC: had concerns including Hypertension, Medication Refill, and Hyperlipidemia.  HPI:Wilmer Gonsales (:  1980) is a 43 y.o. male here for a routine evaluation of the following: Hypertension, Medication Refill, and Hyperlipidemia       Acute concerns this visit:   Here for medication refills, last seen on 2023. Has been out of his medication sfor the past 2 days. States he checks his BP at home which is arnd 140SBP  He denies significant fmhx fo stroke or cardiac disease.  Patient's blood work was drawn during this office visit as well as urine studies for proteinuria.       HTN  -uncontrolled 2/2 being out of medications about 2 days ago per patient   -refilling medications today; blood work ordered      HLD: continue statin 2/2 hx of stroke in ; continue ASA 2/2 stroke   Subacute left corona radiata stroke: , no residual deficit   -repeat lipid but patient has not had it drawn   On Lipitor (atorvastatin) 40 mg daily   No side effects reported     Lab Results   Component Value Date    CHOLFAST 137 2024    HDL 52 2024    LDLCHOLESTEROL 72 2024    TRIGLYCFAST 63 2024          The ASCVD Risk score (Dieudonne MARIA, et al., 2019) failed to calculate for the following reasons:    The patient has a prior MI or stroke diagnosis     HCM  -blood work and screening labs   -patient deferring vaccinations        Assessment   1. Uncontrolled hypertension  2. Primary hypertension  -     amLODIPine (NORVASC) 10 MG tablet; Take 1 tablet by mouth daily, Disp-90 tablet, R-1Normal  -     atorvastatin (LIPITOR) 40 MG tablet; Take 1 tablet by mouth nightly, Disp-90 tablet, R-1Normal  -     hydrALAZINE (APRESOLINE) 50 MG tablet; Take 1 tablet by mouth every 8 hours, Disp-270 tablet, R-1Normal  -     hydroCHLOROthiazide (HYDRODIURIL) 25 MG tablet; Take 1 tablet by mouth daily, Disp-90 tablet, R-1Normal  -

## 2024-04-11 ENCOUNTER — OFFICE VISIT (OUTPATIENT)
Dept: INTERNAL MEDICINE | Age: 44
End: 2024-04-11
Payer: MEDICAID

## 2024-04-11 VITALS
HEIGHT: 71 IN | TEMPERATURE: 97.3 F | OXYGEN SATURATION: 98 % | WEIGHT: 207 LBS | RESPIRATION RATE: 18 BRPM | BODY MASS INDEX: 28.98 KG/M2 | SYSTOLIC BLOOD PRESSURE: 140 MMHG | DIASTOLIC BLOOD PRESSURE: 101 MMHG | HEART RATE: 92 BPM

## 2024-04-11 DIAGNOSIS — N18.2 CKD (CHRONIC KIDNEY DISEASE) STAGE 2, GFR 60-89 ML/MIN: ICD-10-CM

## 2024-04-11 DIAGNOSIS — Z11.59 ENCOUNTER FOR HEPATITIS C SCREENING TEST FOR LOW RISK PATIENT: ICD-10-CM

## 2024-04-11 DIAGNOSIS — I10 PRIMARY HYPERTENSION: ICD-10-CM

## 2024-04-11 DIAGNOSIS — E78.5 HYPERLIPIDEMIA, UNSPECIFIED HYPERLIPIDEMIA TYPE: ICD-10-CM

## 2024-04-11 DIAGNOSIS — I10 UNCONTROLLED HYPERTENSION: Primary | ICD-10-CM

## 2024-04-11 LAB
ANION GAP SERPL CALCULATED.3IONS-SCNC: 19 MMOL/L (ref 7–16)
BASOPHILS ABSOLUTE: 0.02 K/UL (ref 0–0.2)
BASOPHILS RELATIVE PERCENT: 0 % (ref 0–2)
BUN BLDV-MCNC: 22 MG/DL (ref 6–20)
CALCIUM SERPL-MCNC: 9.4 MG/DL (ref 8.6–10.2)
CHLORIDE BLD-SCNC: 105 MMOL/L (ref 98–107)
CHOLESTEROL, FASTING: 137 MG/DL
CO2: 20 MMOL/L (ref 22–29)
CREAT SERPL-MCNC: 1.3 MG/DL (ref 0.7–1.2)
CREATININE URINE: 159.2 MG/DL (ref 40–278)
CREATININE URINE: 160.1 MG/DL (ref 40–278)
EOSINOPHILS ABSOLUTE: 0.17 K/UL (ref 0.05–0.5)
EOSINOPHILS RELATIVE PERCENT: 3 % (ref 0–6)
GFR SERPL CREATININE-BSD FRML MDRD: 71 ML/MIN/1.73M2
GLUCOSE BLD-MCNC: 91 MG/DL (ref 74–99)
HCT VFR BLD CALC: 42.9 % (ref 37–54)
HDLC SERPL-MCNC: 52 MG/DL
HEMOGLOBIN: 14.3 G/DL (ref 12.5–16.5)
HEPATITIS C ANTIBODY: NONREACTIVE
IMMATURE GRANULOCYTES %: 1 % (ref 0–5)
IMMATURE GRANULOCYTES ABSOLUTE: 0.05 K/UL (ref 0–0.58)
LDL CHOLESTEROL: 72 MG/DL
LYMPHOCYTES ABSOLUTE: 1.52 K/UL (ref 1.5–4)
LYMPHOCYTES RELATIVE PERCENT: 26 % (ref 20–42)
MCH RBC QN AUTO: 29.5 PG (ref 26–35)
MCHC RBC AUTO-ENTMCNC: 33.3 G/DL (ref 32–34.5)
MCV RBC AUTO: 88.6 FL (ref 80–99.9)
MICROALBUMIN/CREAT 24H UR: <12 MG/L (ref 0–19)
MICROALBUMIN/CREAT UR-RTO: NORMAL MCG/MG CREAT (ref 0–30)
MONOCYTES ABSOLUTE: 0.61 K/UL (ref 0.1–0.95)
MONOCYTES RELATIVE PERCENT: 10 % (ref 2–12)
NEUTROPHILS ABSOLUTE: 3.59 K/UL (ref 1.8–7.3)
NEUTROPHILS RELATIVE PERCENT: 60 % (ref 43–80)
PDW BLD-RTO: 12.5 % (ref 11.5–15)
PLATELET # BLD: 157 K/UL (ref 130–450)
PMV BLD AUTO: 10.4 FL (ref 7–12)
POTASSIUM SERPL-SCNC: 3.9 MMOL/L (ref 3.5–5)
RBC # BLD: 4.84 M/UL (ref 3.8–5.8)
SODIUM BLD-SCNC: 144 MMOL/L (ref 132–146)
TRIGLYCERIDE, FASTING: 63 MG/DL
VLDLC SERPL CALC-MCNC: 13 MG/DL
WBC # BLD: 6 K/UL (ref 4.5–11.5)

## 2024-04-11 PROCEDURE — 3077F SYST BP >= 140 MM HG: CPT | Performed by: INTERNAL MEDICINE

## 2024-04-11 PROCEDURE — 3080F DIAST BP >= 90 MM HG: CPT | Performed by: INTERNAL MEDICINE

## 2024-04-11 PROCEDURE — 99213 OFFICE O/P EST LOW 20 MIN: CPT | Performed by: INTERNAL MEDICINE

## 2024-04-11 PROCEDURE — 36415 COLL VENOUS BLD VENIPUNCTURE: CPT | Performed by: INTERNAL MEDICINE

## 2024-04-11 RX ORDER — ASPIRIN 81 MG/1
81 TABLET, CHEWABLE ORAL DAILY
Qty: 90 TABLET | Refills: 1 | Status: SHIPPED | OUTPATIENT
Start: 2024-04-11

## 2024-04-11 RX ORDER — HYDROCHLOROTHIAZIDE 25 MG/1
25 TABLET ORAL DAILY
Qty: 90 TABLET | Refills: 1 | Status: SHIPPED | OUTPATIENT
Start: 2024-04-11

## 2024-04-11 RX ORDER — HYDRALAZINE HYDROCHLORIDE 50 MG/1
50 TABLET, FILM COATED ORAL EVERY 8 HOURS SCHEDULED
Qty: 270 TABLET | Refills: 1 | Status: SHIPPED | OUTPATIENT
Start: 2024-04-11

## 2024-04-11 RX ORDER — AMLODIPINE BESYLATE 10 MG/1
10 TABLET ORAL DAILY
Qty: 90 TABLET | Refills: 1 | Status: SHIPPED | OUTPATIENT
Start: 2024-04-11

## 2024-04-11 RX ORDER — ATORVASTATIN CALCIUM 40 MG/1
40 TABLET, FILM COATED ORAL NIGHTLY
Qty: 90 TABLET | Refills: 1 | Status: SHIPPED | OUTPATIENT
Start: 2024-04-11

## 2024-04-11 SDOH — ECONOMIC STABILITY: FOOD INSECURITY: WITHIN THE PAST 12 MONTHS, THE FOOD YOU BOUGHT JUST DIDN'T LAST AND YOU DIDN'T HAVE MONEY TO GET MORE.: NEVER TRUE

## 2024-04-11 SDOH — ECONOMIC STABILITY: FOOD INSECURITY: WITHIN THE PAST 12 MONTHS, YOU WORRIED THAT YOUR FOOD WOULD RUN OUT BEFORE YOU GOT MONEY TO BUY MORE.: SOMETIMES TRUE

## 2024-04-11 SDOH — ECONOMIC STABILITY: INCOME INSECURITY: HOW HARD IS IT FOR YOU TO PAY FOR THE VERY BASICS LIKE FOOD, HOUSING, MEDICAL CARE, AND HEATING?: SOMEWHAT HARD

## 2024-04-11 ASSESSMENT — PATIENT HEALTH QUESTIONNAIRE - PHQ9
SUM OF ALL RESPONSES TO PHQ9 QUESTIONS 1 & 2: 0
SUM OF ALL RESPONSES TO PHQ9 QUESTIONS 1 & 2: 0
SUM OF ALL RESPONSES TO PHQ QUESTIONS 1-9: 0
2. FEELING DOWN, DEPRESSED OR HOPELESS: NOT AT ALL
1. LITTLE INTEREST OR PLEASURE IN DOING THINGS: NOT AT ALL
SUM OF ALL RESPONSES TO PHQ QUESTIONS 1-9: 0
1. LITTLE INTEREST OR PLEASURE IN DOING THINGS: NOT AT ALL
2. FEELING DOWN, DEPRESSED OR HOPELESS: NOT AT ALL

## 2024-04-11 NOTE — PATIENT INSTRUCTIONS
[employer]*  (Call 211 if need more resources.)     HELP NETWORK OF [facility]:  What they do: Provides 24-hr, 7 days a week access to information on community resources for financial help. [facility] AND [facility]  Phone: 211 or [phone]    [facility] FAMILY SERVICE: [address], [place] [address]  What they offer: Limited assistance to restore/ prevent utility disconnection.  Phone Number: [phone]  Website: [employer]    DEPARTMENT OF JOB AND FAMILY SERVICES:  MAIN S LINE FOR ALL [facility]: [phone]  What they do: [place] works first with temporary cash assistance if there are children in household.   [facility] DJFS: [address] [place] [address]  Phone: [phone], [phone]  [facility] DJFS: [address] [place] [address]  Phone: [phone]  [facility] DJFS: [address] [place] [address]  Phone: [phone]  Website: [employer]    [employer] Financial Assistance  What they offer: Assistance with [employer] bills  Phone: [phone], option #5     Medications:  [facility]  What they offer: [employer] tracks prescription drug prices and provides free drug coupons for discounts on medications.  Website: https://www.Roadtrippers    [employer]  What they offer: [employer] offers free information on medications and healthcare cost savings programs including prescription assistance programs, coupons, and discount programs.  Helpline: 592.681.6416  Website: https://www.[employer]itract.org    [employer] Assist  What they offer: Information about free and low-cost medicine programs.  Website: https://www.rxassist.org/    [employer] $4 Prescription Program  What they offer: Prescription Program includes up to a 30-day supply for $4 and a 90-day supply for $10 of some covered generic drugs at commonly prescribed dosages  Website: https://www.Zyga/cp/4-prescriptions/9585129    [employer] Prescription Assistance

## 2024-04-11 NOTE — PROGRESS NOTES
Pt screened positive for SDOH related to financial strain, transportation, housing and or food insecurity and declined further contact for assessment/resources.

## 2024-04-11 NOTE — PROGRESS NOTES
Three tubes of blood (one gold, one green and one lavender) drawn from [facility] at 0947 and sent via tube system, along with urine sample, at 1006. See media. Thalia Craig, NIGELN

## 2024-04-11 NOTE — PROGRESS NOTES
[facility]  Internal Medicine Residency Clinic    Attending Physician Statement  I have discussed the case, including pertinent history and exam findings with the resident physician.  I agree with the assessment, plan and orders as documented by the resident. I have reviewed the relevant PMHx, PSHx, FamHx, SocialHx, medications, and allergies and updated history as appropriate.    Patient presents for routine follow up of medical problems.  Presents for med refills  Denies any new complaints  BP remains uncontrolled and review of office visits shows continued trend- in order to adequately improve 2nd risk reduction with prior hx of stroke- improved adherence to regimen and titration is needed- patient notes out of meds for 2 days  Would benefit from lab draw today- all labs remains overdue   Consider at next 1 week follow-up visit with BP log- if still not at goal- referral for 24 hour ambulatory monitoring of BP to assess nocturnal HTN and Nephrology assessment   2nd work-up also to be considered     Ischemic Stroke- 2021  Aggressive risk factor management ongoing to reduce 2nd stroke   Last lipids in 2021- need repeat- labs remain overdue- did not complaint  Continue ASA and high intensity statin  Goal LDL should target less than 70   BP management ongoing     Hypertension- primary and Uncontrolled   Remains uncontrolled in the office  Review of BP readings during hospital visits- continue to be uncontrolled  States home monitoring ongoing but no log to review independently  He does states he ran out of meds for last 2 days  Resume home meds   Present back in 1 week and home log given to patient to complete and bring in at next visit   If BP remains not at goal with adherence to regimen, recommend setting up nephrology assessment and 24 ambulatory monitoring- patient would benefit from assessment of nocturnal hypertension to assess further reduction in 2nd risk reduction  Labs remain

## 2024-04-12 ENCOUNTER — TELEPHONE (OUTPATIENT)
Dept: INTERNAL MEDICINE | Age: 44
End: 2024-04-12

## 2024-04-12 DIAGNOSIS — N18.2 CKD (CHRONIC KIDNEY DISEASE) STAGE 2, GFR 60-89 ML/MIN: Primary | ICD-10-CM

## 2024-04-12 LAB
CREATININE URINE: 153.8 MG/DL (ref 40–278)
TOTAL PROTEIN, URINE: 7 MG/DL (ref 0–12)
URINE TOTAL PROTEIN CREATININE RATIO: 0.05 (ref 0–0.2)

## 2024-04-12 RX ORDER — SODIUM BICARBONATE 325 MG/1
325 TABLET ORAL 2 TIMES DAILY
Qty: 60 TABLET | Refills: 0 | Status: SHIPPED | OUTPATIENT
Start: 2024-04-12 | End: 2024-05-12

## 2024-04-12 RX ORDER — SODIUM BICARBONATE 325 MG/1
325 TABLET ORAL 2 TIMES DAILY
Qty: 28 TABLET | Refills: 0 | Status: SHIPPED
Start: 2024-04-12 | End: 2024-04-12 | Stop reason: SDUPTHER

## 2024-04-12 NOTE — TELEPHONE ENCOUNTER
I called patient to discuss results of his labs.  Discussed with him concerning his bicarb level which is 20 on the most recent lab. His creatinine level has tapered to 1.3 since 2021, He is agreeable to starting bicarb tablets.     Electronically signed by Saul Garcia MD on 4/12/2024 at 1:37 PM

## 2024-04-12 NOTE — TELEPHONE ENCOUNTER
I called patient to discuss his labs.  Call went in and sent.  Unable to leave voicemail, work phone.

## 2024-11-06 DIAGNOSIS — I10 PRIMARY HYPERTENSION: ICD-10-CM

## 2024-11-07 DIAGNOSIS — I10 PRIMARY HYPERTENSION: ICD-10-CM

## 2024-11-07 RX ORDER — AMLODIPINE BESYLATE 10 MG/1
10 TABLET ORAL DAILY
Qty: 14 TABLET | Refills: 0 | Status: SHIPPED | OUTPATIENT
Start: 2024-11-07

## 2024-11-07 NOTE — TELEPHONE ENCOUNTER
Called pt and informed needs an appointment for further refills >than 6 months ago . Pt agreed upon this and appt was made with his PCP 11/20/24 requesting enough pills to next visit if possible

## 2024-11-08 RX ORDER — AMLODIPINE BESYLATE 10 MG/1
10 TABLET ORAL DAILY
Qty: 90 TABLET | Refills: 1 | OUTPATIENT
Start: 2024-11-08

## 2024-11-20 ENCOUNTER — OFFICE VISIT (OUTPATIENT)
Dept: INTERNAL MEDICINE | Age: 44
End: 2024-11-20
Payer: MEDICAID

## 2024-11-20 VITALS
WEIGHT: 200 LBS | DIASTOLIC BLOOD PRESSURE: 81 MMHG | OXYGEN SATURATION: 98 % | BODY MASS INDEX: 28 KG/M2 | SYSTOLIC BLOOD PRESSURE: 138 MMHG | TEMPERATURE: 97 F | HEART RATE: 92 BPM | RESPIRATION RATE: 18 BRPM | HEIGHT: 71 IN

## 2024-11-20 DIAGNOSIS — Z12.11 COLON CANCER SCREENING: Primary | ICD-10-CM

## 2024-11-20 DIAGNOSIS — E78.5 HYPERLIPIDEMIA, UNSPECIFIED HYPERLIPIDEMIA TYPE: ICD-10-CM

## 2024-11-20 DIAGNOSIS — I10 PRIMARY HYPERTENSION: ICD-10-CM

## 2024-11-20 DIAGNOSIS — I63.9 CEREBROVASCULAR ACCIDENT (CVA), UNSPECIFIED MECHANISM (HCC): ICD-10-CM

## 2024-11-20 PROCEDURE — 99212 OFFICE O/P EST SF 10 MIN: CPT

## 2024-11-20 RX ORDER — AMLODIPINE BESYLATE 10 MG/1
10 TABLET ORAL DAILY
Qty: 90 TABLET | Refills: 1 | Status: SHIPPED
Start: 2024-11-20 | End: 2024-11-20 | Stop reason: SDUPTHER

## 2024-11-20 RX ORDER — HYDRALAZINE HYDROCHLORIDE 50 MG/1
50 TABLET, FILM COATED ORAL EVERY 8 HOURS SCHEDULED
Qty: 270 TABLET | Refills: 1 | Status: SHIPPED
Start: 2024-11-20 | End: 2024-11-20 | Stop reason: SDUPTHER

## 2024-11-20 RX ORDER — ASPIRIN 81 MG/1
81 TABLET, CHEWABLE ORAL DAILY
Qty: 90 TABLET | Refills: 1 | Status: SHIPPED
Start: 2024-11-20 | End: 2024-11-20 | Stop reason: SDUPTHER

## 2024-11-20 RX ORDER — HYDROCHLOROTHIAZIDE 25 MG/1
25 TABLET ORAL DAILY
Qty: 90 TABLET | Refills: 1 | Status: SHIPPED
Start: 2024-11-20 | End: 2024-11-20 | Stop reason: SDUPTHER

## 2024-11-20 RX ORDER — ATORVASTATIN CALCIUM 40 MG/1
40 TABLET, FILM COATED ORAL NIGHTLY
Qty: 90 TABLET | Refills: 1 | Status: SHIPPED
Start: 2024-11-20 | End: 2024-11-20 | Stop reason: SDUPTHER

## 2024-11-20 ASSESSMENT — ENCOUNTER SYMPTOMS
ALLERGIC/IMMUNOLOGIC NEGATIVE: 1
EYES NEGATIVE: 1
RESPIRATORY NEGATIVE: 1
GASTROINTESTINAL NEGATIVE: 1

## 2024-11-20 NOTE — PROGRESS NOTES
[facility] Internal Medicine      SUBJECTIVE:  Wilmer Gonsales (:  1980) is a 43 y.o. male here for evaluation of the following chief complaint(s):  Hypertension  Last visit summary   Patient was last time seen in the office on 2024 , /110 (pt has Hx of stroke he was ou of medication for past 2 days) Patient's blood work was drawn during this office visit as well as urine studies for proteinuria.   home log given   If BP remains not at goal with adherence to regimen, recommend setting up nephrology assessment and 24 ambulatory monitoring- patient would benefit from assessment of nocturnal hypertension to assess further reduction in 2nd risk reduction       Micro albumin <12 urin Cr 159.2 mg/dl , urin prt/cr 0.05 Cr 1.3 eGFR 71 LDL 72     Today   43 Y.O male with PMHx HTN, CKD stage II, CVA and TIA, he is here today for routine follow up, he is happy with his BP and does not want a stricter control, he works in cleaning service, he has never smoked, he has never drank alcohol, no drugs,  he has been  for past   2 years, he has a 12 years old daughter stays with her mom but spent her mojority of time with him      Primary HTN   BP today 138/81, BP at home is the same   Norvasc 10 daily, HCTZ 25 daily, Hydralazine 50 TID     CVA    Subacute stroke seen on MRI at the inferior aspect of left corona radiata   hypercoagulable panel: negative   ASA 81   Lipitor 40    Hyperlipidemia   Last LDL 71 in April   Resume lipitor 40     CKD stage II possibly secondary to HTN       HCM  No vaccination   No history of colon cancer in the family   He is agreeable for Cologuard   Review of Systems   Constitutional: Negative.    HENT: Negative.     Eyes: Negative.    Respiratory: Negative.     Cardiovascular: Negative.    Gastrointestinal: Negative.    Endocrine: Negative.    Genitourinary: Negative.    Musculoskeletal: Negative.    Skin: Negative.

## 2024-11-20 NOTE — PATIENT INSTRUCTIONS
Dear Wilmer Gonsales,        Thank you for coming to your appointment today. I hope we have addressed all of your needs.       Please make sure to do the following:  - Continue your medications as listed.  - Get labs drawn before our next follow up. We will call you with the results     - We will see each other again in 5 month         Have a great day!        Sincerely,  Kenzie Gannon MD  11/20/2024  10:05 AM

## 2024-11-20 NOTE — PROGRESS NOTES
Pt called in requesting his medication go to [facility] on [address]. Please resend medication .

## 2024-11-20 NOTE — PROGRESS NOTES
[facility]  Internal Medicine Residency Clinic  Attending Physician Statement:  Shaka Figueroa M.D., F.A.C.P.  Patient is seen for fu visit today.  -- acute and chronic problems addressed  Addressed when applicable- Health maintenance issues of vaccinations, social determinants/depression/CA screening, tobacco cessation etc...  I have seen/discussed the case, including pertinent history and exam findings with the resident, review of last visit medical records/labs-   I agree with the assessment, plan and orders as documented by the resident.    -Billiing assessed by medical complexity of case  My assessment of high points of todays visit as follows:       HTN fu on multiple meds  /81 (Site: Left Upper Arm, Position: Sitting, Cuff Size: Large Adult)   Pulse 92   Temp 97 °F (36.1 °C) (Temporal)   Resp 18   Ht 1.803 m (5' 11\")   Wt 90.7 kg (200 lb)   SpO2 98%   BMI 27.89 kg/m²   We discussed hx of CVA and strict bP control  But he will Defer change in BP meds- \"he is happy with this number\"  Refil meds  Ldl 71 on statin - -in apirl  Asa 81mg QD- no recurrent CVA    Health care maintenance issues addressed  Cologuard he will agree to do  Declines vaccination

## 2024-11-21 RX ORDER — ATORVASTATIN CALCIUM 40 MG/1
40 TABLET, FILM COATED ORAL NIGHTLY
Qty: 90 TABLET | Refills: 1 | Status: SHIPPED | OUTPATIENT
Start: 2024-11-21

## 2024-11-21 RX ORDER — HYDRALAZINE HYDROCHLORIDE 50 MG/1
50 TABLET, FILM COATED ORAL EVERY 8 HOURS SCHEDULED
Qty: 270 TABLET | Refills: 1 | Status: SHIPPED | OUTPATIENT
Start: 2024-11-21

## 2024-11-21 RX ORDER — ASPIRIN 81 MG/1
81 TABLET, CHEWABLE ORAL DAILY
Qty: 90 TABLET | Refills: 1 | Status: SHIPPED | OUTPATIENT
Start: 2024-11-21

## 2024-11-21 RX ORDER — HYDROCHLOROTHIAZIDE 25 MG/1
25 TABLET ORAL DAILY
Qty: 90 TABLET | Refills: 1 | Status: SHIPPED | OUTPATIENT
Start: 2024-11-21

## 2024-11-21 RX ORDER — AMLODIPINE BESYLATE 10 MG/1
10 TABLET ORAL DAILY
Qty: 90 TABLET | Refills: 1 | Status: SHIPPED | OUTPATIENT
Start: 2024-11-21

## 2025-01-17 NOTE — PLAN OF CARE
Problem: Falls - Risk of:  Goal: Will remain free from falls  Description: Will remain free from falls  Outcome: Met This Shift  Goal: Absence of physical injury  Description: Absence of physical injury  Outcome: Met This Shift Hide Additional Notes?: No Detail Level: Detailed Exam/Medical Decision Making

## 2025-05-24 DIAGNOSIS — I10 PRIMARY HYPERTENSION: ICD-10-CM

## 2025-05-27 DIAGNOSIS — I10 PRIMARY HYPERTENSION: ICD-10-CM

## 2025-05-27 RX ORDER — HYDROCHLOROTHIAZIDE 25 MG/1
25 TABLET ORAL DAILY
Qty: 90 TABLET | Refills: 1 | OUTPATIENT
Start: 2025-05-27

## 2025-05-27 RX ORDER — HYDRALAZINE HYDROCHLORIDE 50 MG/1
50 TABLET, FILM COATED ORAL EVERY 8 HOURS SCHEDULED
Qty: 270 TABLET | Refills: 1 | OUTPATIENT
Start: 2025-05-27

## 2025-05-27 RX ORDER — AMLODIPINE BESYLATE 10 MG/1
10 TABLET ORAL DAILY
Qty: 90 TABLET | Refills: 1 | OUTPATIENT
Start: 2025-05-27

## 2025-06-07 DIAGNOSIS — I10 PRIMARY HYPERTENSION: ICD-10-CM

## 2025-06-09 RX ORDER — AMLODIPINE BESYLATE 10 MG/1
10 TABLET ORAL DAILY
Qty: 90 TABLET | Refills: 1 | OUTPATIENT
Start: 2025-06-09

## 2025-06-10 DIAGNOSIS — I63.9 CEREBROVASCULAR ACCIDENT (CVA), UNSPECIFIED MECHANISM (HCC): ICD-10-CM

## 2025-06-10 DIAGNOSIS — I10 PRIMARY HYPERTENSION: ICD-10-CM

## 2025-06-10 DIAGNOSIS — E78.5 HYPERLIPIDEMIA, UNSPECIFIED HYPERLIPIDEMIA TYPE: ICD-10-CM

## 2025-06-10 RX ORDER — ATORVASTATIN CALCIUM 40 MG/1
40 TABLET, FILM COATED ORAL NIGHTLY
Qty: 15 TABLET | Refills: 0 | Status: SHIPPED | OUTPATIENT
Start: 2025-06-10

## 2025-06-10 RX ORDER — HYDRALAZINE HYDROCHLORIDE 50 MG/1
50 TABLET, FILM COATED ORAL EVERY 8 HOURS SCHEDULED
Qty: 30 TABLET | Refills: 0 | Status: SHIPPED | OUTPATIENT
Start: 2025-06-10

## 2025-06-10 RX ORDER — ASPIRIN 81 MG/1
81 TABLET, CHEWABLE ORAL DAILY
Qty: 15 TABLET | Refills: 0 | Status: SHIPPED | OUTPATIENT
Start: 2025-06-10

## 2025-06-10 RX ORDER — HYDROCHLOROTHIAZIDE 25 MG/1
25 TABLET ORAL DAILY
Qty: 15 TABLET | Refills: 0 | Status: SHIPPED | OUTPATIENT
Start: 2025-06-10

## 2025-06-10 RX ORDER — AMLODIPINE BESYLATE 10 MG/1
10 TABLET ORAL DAILY
Qty: 15 TABLET | Refills: 0 | Status: SHIPPED | OUTPATIENT
Start: 2025-06-10

## 2025-06-10 NOTE — TELEPHONE ENCOUNTER
FROM PT: \"I don't have enough medication to last me until my next doctor's visit and its causing my blood pressure to run high! I just need enough until my visit\"  15 pills queued for pt.        Name of Medication(s) Requested:  Requested Prescriptions     Pending Prescriptions Disp Refills    amLODIPine (NORVASC) 10 MG tablet 15 tablet 0     Sig: Take 1 tablet by mouth daily    hydrALAZINE (APRESOLINE) 50 MG tablet 30 tablet 0     Sig: Take 1 tablet by mouth every 8 hours    hydroCHLOROthiazide (HYDRODIURIL) 25 MG tablet 15 tablet 0     Sig: Take 1 tablet by mouth daily    atorvastatin (LIPITOR) 40 MG tablet 15 tablet 0     Sig: Take 1 tablet by mouth nightly    aspirin (ASPIRIN LOW DOSE) 81 MG chewable tablet 15 tablet 0     Sig: Take 1 tablet by mouth daily chew and swallow       Medication is on current medication list Yes    Dosage and directions were verified? Yes    Quantity verified: 30 day supply     Pharmacy Verified?  Yes    Last Appointment:  11/20/2024    Future appts:  Future Appointments   Date Time Provider Department Center   6/25/2025  8:00 AM Sabrina Langston MD [facility]        (If no appt send self scheduling link. .REFILLAPPT)  Scheduling request sent?     [] Yes  [x] No    Does patient need updated?  [] Yes  [x] No

## 2025-06-25 NOTE — PROGRESS NOTES
[facility] Internal Medicine      SUBJECTIVE:  Wilmer Gonsales (:  1980) is a 44 y.o. male here for evaluation of the following chief complaint(s):  Medication Refill and Hypertension    HPI: Pt here for routine f/u, medication refill. Last seen 2024, no changes in medication regimen despite elevated BP (pt preference), was sent cologuard-not completed.     A1c screening was discontinued, to continue screenings Q3 years until aged out   5.2% 10/2021   Today 5.3% no concerns.     CVA 2/2 uncontrolled BP,   MRI inferior aspect L corona radiata   No deficits. Hypercoag w/u negative, no significant carotid artery stenosis.   ASA 81  Lipitor 40, last LDL 72 on 2024 - states was taken off, causing vomiting and fatigue, not willing to restart statin at this time. Repeat lipid ordered.     HTN   Norvasc 10, HCTZ 25, hydralazine 50 TID, has been out of medications for 1 week,  on repeat, asymptomatic, pt to  medications and restart today, checks at home daily usually SBP in 130s. Counseled on concerning high/low values, associated symptoms with each and when to present to ED.   Weight 206, counseled on diet and exercise, pt stated new weight goal 185lbs.    Counseled on the potential benefits of low-salt diet as well, pt will start.   Does not snore, good sleep quality, no daytime fatigue.   Never smoker.     CKD Stage II 2/2 HTN   Baseline Cr 1.3   U protein/Cr 0.05 2024   Repeat labs and urine studies ordered.     Review of Systems   Constitutional:  Negative for activity change, appetite change and fatigue.   HENT:  Negative for tinnitus.    Eyes:  Negative for photophobia, pain and visual disturbance.   Respiratory:  Negative for cough, chest tightness and shortness of breath.    Cardiovascular:  Negative for chest pain, palpitations and leg swelling.   Gastrointestinal:  Negative for abdominal pain, constipation, diarrhea, nausea and

## 2025-06-27 ENCOUNTER — OFFICE VISIT (OUTPATIENT)
Age: 45
End: 2025-06-27
Payer: MEDICAID

## 2025-06-27 VITALS
HEART RATE: 74 BPM | RESPIRATION RATE: 16 BRPM | DIASTOLIC BLOOD PRESSURE: 92 MMHG | OXYGEN SATURATION: 97 % | WEIGHT: 206 LBS | BODY MASS INDEX: 28.84 KG/M2 | SYSTOLIC BLOOD PRESSURE: 176 MMHG | TEMPERATURE: 97.8 F | HEIGHT: 71 IN

## 2025-06-27 DIAGNOSIS — Z13.1 DIABETES MELLITUS SCREENING: ICD-10-CM

## 2025-06-27 DIAGNOSIS — I10 PRIMARY HYPERTENSION: Primary | ICD-10-CM

## 2025-06-27 DIAGNOSIS — I63.89 CEREBROVASCULAR ACCIDENT (CVA) DUE TO OTHER MECHANISM (HCC): ICD-10-CM

## 2025-06-27 DIAGNOSIS — N18.2 CKD (CHRONIC KIDNEY DISEASE) STAGE 2, GFR 60-89 ML/MIN: ICD-10-CM

## 2025-06-27 PROBLEM — R79.89 ELEVATED SERUM CREATININE: Status: RESOLVED | Noted: 2021-10-24 | Resolved: 2025-06-27

## 2025-06-27 PROBLEM — I16.1 HYPERTENSIVE EMERGENCY: Status: RESOLVED | Noted: 2021-10-24 | Resolved: 2025-06-27

## 2025-06-27 LAB — HBA1C MFR BLD: 5.3 %

## 2025-06-27 PROCEDURE — 99213 OFFICE O/P EST LOW 20 MIN: CPT

## 2025-06-27 PROCEDURE — 99212 OFFICE O/P EST SF 10 MIN: CPT

## 2025-06-27 PROCEDURE — 83036 HEMOGLOBIN GLYCOSYLATED A1C: CPT

## 2025-06-27 PROCEDURE — 3077F SYST BP >= 140 MM HG: CPT

## 2025-06-27 PROCEDURE — PBSHW POCT GLYCOSYLATED HEMOGLOBIN (HGB A1C): Performed by: INTERNAL MEDICINE

## 2025-06-27 PROCEDURE — 3079F DIAST BP 80-89 MM HG: CPT

## 2025-06-27 RX ORDER — ASPIRIN 81 MG/1
81 TABLET, CHEWABLE ORAL DAILY
Qty: 90 TABLET | Refills: 1 | Status: SHIPPED | OUTPATIENT
Start: 2025-06-27

## 2025-06-27 RX ORDER — HYDROCHLOROTHIAZIDE 25 MG/1
25 TABLET ORAL DAILY
Qty: 90 TABLET | Refills: 1 | Status: SHIPPED | OUTPATIENT
Start: 2025-06-27

## 2025-06-27 RX ORDER — AMLODIPINE BESYLATE 10 MG/1
10 TABLET ORAL DAILY
Qty: 90 TABLET | Refills: 1 | Status: SHIPPED | OUTPATIENT
Start: 2025-06-27

## 2025-06-27 RX ORDER — HYDRALAZINE HYDROCHLORIDE 50 MG/1
50 TABLET, FILM COATED ORAL EVERY 8 HOURS SCHEDULED
Qty: 270 TABLET | Refills: 1 | Status: SHIPPED | OUTPATIENT
Start: 2025-06-27

## 2025-06-27 SDOH — ECONOMIC STABILITY: FOOD INSECURITY: WITHIN THE PAST 12 MONTHS, THE FOOD YOU BOUGHT JUST DIDN'T LAST AND YOU DIDN'T HAVE MONEY TO GET MORE.: NEVER TRUE

## 2025-06-27 SDOH — ECONOMIC STABILITY: FOOD INSECURITY: WITHIN THE PAST 12 MONTHS, YOU WORRIED THAT YOUR FOOD WOULD RUN OUT BEFORE YOU GOT MONEY TO BUY MORE.: NEVER TRUE

## 2025-06-27 ASSESSMENT — PATIENT HEALTH QUESTIONNAIRE - PHQ9
1. LITTLE INTEREST OR PLEASURE IN DOING THINGS: NOT AT ALL
SUM OF ALL RESPONSES TO PHQ QUESTIONS 1-9: 0
SUM OF ALL RESPONSES TO PHQ QUESTIONS 1-9: 0
2. FEELING DOWN, DEPRESSED OR HOPELESS: NOT AT ALL
SUM OF ALL RESPONSES TO PHQ QUESTIONS 1-9: 0
SUM OF ALL RESPONSES TO PHQ QUESTIONS 1-9: 0

## 2025-06-27 NOTE — PATIENT INSTRUCTIONS
Dear Wilmer Gonsales,    Thank you for coming to your appointment at [facility].    Please make sure to do the following:    - Continue medications as listed and contact our office if medications are unavailable at the pharmacy.    - If lab work was ordered please complete as instructed.    - If a referral was placed to another doctor's office, please contact our office in 5 business days if you have not heard from that office regarding the referral.    - If any imaging test was ordered at today's visit (ultrasound, CT scan, or MRI), expect a phone call to schedule in the next 5 business days. You may also call [facility] Imaging Scheduling department at [phone] to schedule.    Contact our office if you develop any new or worsening symptoms or if you have any questions regarding today's visit.    We aim to address your healthcare needs to your satisfaction!    Sincerely,  Sabrina Langston MD  6/27/2025  8:28 AM

## 2025-06-27 NOTE — PROGRESS NOTES
[facility]  Internal Medicine Residency Clinic  Attending Physician Statement:  Shaka Figueroa M.D., F.A.C.P.  Patient is seen for fu visit today.  -- acute and chronic problems addressed  Addressed when applicable- Health maintenance issues of vaccinations, social determinants/depression/CA screening, tobacco cessation etc...  I have discussed the case, including pertinent history and exam findings with the resident, review of last visit medical records/labs-   I agree with the assessment, plan and orders as documented by the resident.    -Billiing assessed by medical complexity of case  My assessment of high points of todays visit as follows:    Previously BP controlled at home, when on meds    Highest BP at home 139/81  Higher here, been out of meds for one week  Hx of CVA- workup done, ASA 81mg QD  Refuses to go back on statin (claims vomiting and fatigue)  Repeat lipids  Discussed diet, weight loss, possibly helpful HTN/lipids  Ckd stage 2  cr 1.3 ave, alt 42  Aic <5.7 in past, Not prediabetic

## 2025-08-04 ENCOUNTER — TELEPHONE (OUTPATIENT)
Age: 45
End: 2025-08-04